# Patient Record
Sex: FEMALE | Race: WHITE | Employment: OTHER | URBAN - NONMETROPOLITAN AREA
[De-identification: names, ages, dates, MRNs, and addresses within clinical notes are randomized per-mention and may not be internally consistent; named-entity substitution may affect disease eponyms.]

---

## 2018-07-03 ENCOUNTER — HOSPITAL ENCOUNTER (OUTPATIENT)
Dept: GENERAL RADIOLOGY | Age: 73
Discharge: HOME | End: 2018-07-05
Payer: MEDICARE

## 2018-07-03 DIAGNOSIS — R13.14 PHARYNGOESOPHAGEAL DYSPHAGIA: ICD-10-CM

## 2018-07-03 PROCEDURE — G8997 SWALLOW GOAL STATUS: HCPCS

## 2018-07-03 PROCEDURE — G8998 SWALLOW D/C STATUS: HCPCS

## 2018-07-03 PROCEDURE — A4641 RADIOPHARM DX AGENT NOC: HCPCS | Performed by: OTOLARYNGOLOGY

## 2018-07-03 PROCEDURE — 74230 X-RAY XM SWLNG FUNCJ C+: CPT

## 2018-07-03 PROCEDURE — G8996 SWALLOW CURRENT STATUS: HCPCS

## 2018-07-03 PROCEDURE — 6360000004 HC RX CONTRAST MEDICATION: Performed by: OTOLARYNGOLOGY

## 2018-07-03 RX ADMIN — BARIUM SULFATE 355 ML: 0.6 SUSPENSION ORAL at 14:21

## 2018-07-03 NOTE — PROCEDURES
INSTRUMENTAL SWALLOW REPORT  MODIFIED BARIUM SWALLOW    NAME: Adair Saenz   : 1945  MRN: [id number]       Date of Eval: 7/3/2018     Referring Physician: Rios Rodas  Referring Diagnosis(es): Referring Diagnosis: R13.12    Past Medical History:  has a past medical history of Eye disease and Thyroid disease. Past Surgical History:  has a past surgical history that includes Pilonidal cyst excision and Foot surgery (Bilateral). Current Diet Solid Consistency: Regular  Current Diet Liquid Consistency: Thin    Type of Study: Repeat MBS  Results of Prior Study: Pt stated she had a previous MBS completed in [place]. Per pt, findings showed \"food getting stuck\" but was unaware of any diet changes made at this time. Patient Complaints/Reason for Referral:  Adair Saenz was referred for a MBS to assess the efficiency of his/her swallow function, assess for aspiration, and to make recommendations regarding safe dietary consistencies, effective compensatory strategies, and safe eating environment. Patient complaints: \"food gets stuck in my throat\"    Onset of problem: Pt states she has had difficulty swallow for approximately 1-2 years. Behavior/Cognition/Vision/Hearing:  Behavior/Cognition: Alert; Cooperative  Vision: Impaired  Vision Exceptions: Wears glasses at all times  Hearing: Within functional limits    Impressions:  Treatment Dx and ICD 10: R13.12   Patient Position: Lateral     Consistencies Administered: Thin teaspoon; Thin cup;Nectar cup;Puree    Compensatory Swallowing Strategies Attempted: Alternate solids and liquids;Effortful swallow;Small bites/sips;Upright as possible for all oral intake;Swallow 2 times per bite/sip; Chin tuck  Postural Changes and/or Swallow Maneuvers Trialed: Chin tuck    Oral Preparation / Oral Phase  Oral Phase: WFL  Oral Phase: lingual stasis but overall functional and age appropriate oral phase.   Unable to assess mech soft or regular solid due to concern of aspiration    Pharyngeal Phase  Pharyngeal Phase: Impaired  Pharyngeal Phase - Major Contributing Deficits  Reduced Tongue Base: Thin teaspoon; Thin cup;Nectar cup;Puree  Shallow Penetration After: Nectar cup;Puree  Deep Penetration During: Thin teaspoon; Thin cup (noted improvement with chin tuck)  Deep Penetration After: Thin teaspoon; Thin cup;Puree (lessened with chin tuck but still present)  Pharyngeal Residue - Valleculae: Thin teaspoon; Thin cup;Nectar cup;Puree  Pharyngeal Residue - UES: Nectar cup;Puree  Pharyngeal Phase: Reduced tongue base retraction resulting in vallecular stasis for all consistencies trialed. Noted deep penetration during swallow for all trials of thin and deep penetration after swallow due to vallecular stasis on thin, thick, and puree. Deep penetration of thins would likely have resulted in aspiration due to no sensation of presence,; however, was expelled when pt was directed to cough. Improvements noted with chin tuck resulting in shallow penetration after the swallow for thickened liquids and puree. Upper Esophageal Sphincter residue was noted to increase with a thicker consistency of the bolus. Esophageal Phase  Esophageal Screen: WFL    Dysphagia Outcome Severity Scale: Level 2: Moderate Severe dysphagia- Maximum assistance or maximum use of strategies with partial PO only  Penetration-Aspiration Scale (PAS): 5 - Material enters the airway, contacts the vocal folds, and is not ejected into the airway    Recommended Diet:  Solid consistency: Dysphagia I Pureed  Liquid consistency: Nectar  Liquid administration via: Cup    Medication administration: Meds in puree    Safe Swallow Protocol:  Supervision: Independent  Compensatory Swallowing Strategies: Chin tuck; Alternate solids and liquids;Effortful swallow;Small bites/sips;Upright as possible for all oral intake;Swallow 2 times per bite/sip    Recommendations/Treatment  Requires SLP Intervention: diet adjustments made her reflux is currently well managed. MBS revealed a moderate-severe oral-pharyngeal dysphagia characterized by lingual stasis and reduced tongue base retraction leading to vallecular stasis and resulting in deep penetration during and after the swallow. Reduced tongue base retraction resulted in vallecular stasis for all consistencies trialed (thin via spoon and cup, thick via cup, and puree). Deep penetration noted during swallow for all trials of thin and deep penetration after swallow due to vallecular stasis on thin, thick, and puree. Deep penetration of thins would likely have resulted in aspiration as the pt had no sensation as no cough or throat clear was elicited; however, it was expelled when pt was directed to elicit a cough. Improvements were noted with the use of a chin tuck resulting in shallow penetration after the swallow for thickened liquids and puree. Pt was also directed to utilize a second effortful swallow while still in chin tuck position to assist with clearance of vallecular residue to eliminate penetration after swallow. Upper Esophageal Sphincter residue was noted to increase with a thicker consistency of the bolus. Mech soft and regular solid were not trialed based on results from previous trials of other consistencies. Recommend pt for therapy for instruction and training of strengthening exercises as well as a repeat MBS after several weeks of treatment to reassess for changes. Recommend diet downgrade to puree and thickened liquids with the use of a chin tuck at all times due to safety concerns.   The following compensatory strategies are also recommended at this time: Alternate solids and liquids;Effortful swallow;Small bites/sips;Upright as possible for all oral intake;Swallow 2 times per bite/sip    Marian Smith M.A. CF-SLP  7/3/2018, 3:02 PM

## 2018-08-02 ENCOUNTER — HOSPITAL ENCOUNTER (OUTPATIENT)
Dept: SPEECH THERAPY | Age: 73
Setting detail: THERAPIES SERIES
Discharge: HOME | End: 2018-08-02
Payer: MEDICARE

## 2018-08-02 PROCEDURE — G8997 SWALLOW GOAL STATUS: HCPCS

## 2018-08-02 PROCEDURE — 92610 EVALUATE SWALLOWING FUNCTION: CPT

## 2018-08-02 PROCEDURE — G8996 SWALLOW CURRENT STATUS: HCPCS

## 2018-08-03 PROCEDURE — G8996 SWALLOW CURRENT STATUS: HCPCS

## 2018-08-03 PROCEDURE — G8997 SWALLOW GOAL STATUS: HCPCS

## 2018-08-03 NOTE — PROGRESS NOTES
Speech Language Pathology  Facility/Department: [address] [place] [address]   BEDSIDE SWALLOW EVALUATION    NAME: Vidya Oneal  : 1945  MRN: [id number]    ADMISSION DATE: 2018      Onset Date:  (~1 year ago)    Past Medical History:  has a past medical history of Eye disease and Thyroid disease. Past Surgical History:  has a past surgical history that includes Pilonidal cyst excision and Foot surgery (Bilateral). Treatment Dx (ICD 10 code): Pharyngoesophageal Dysphagia T47.10  Insurance/Certification Information: Medicare/Humana  Plan of Care Submitted: (y/n) yes      Date of Eval: 8/3/2018  Evaluating Therapist: Van Nagel    Current Diet level:  Current Diet : Regular  Current Liquid Diet : Thin    Primary Complaint:   Patient Complaint: Patient's main c/o of swallowing trouble r/t food \"getting stuck\" in throat area. Patient reports at times it is so bad she cannot breath. Patient reports  has given back blows to loosen food. Patient reports they don't eat out as much d/t being fearful of trouble swallowing and notes that this has become a daily problem/annoyance. Pain:   Pain Assessment  Patient Currently in Pain: Denies & denies pain with swallowing     Reason for Referral  Vidya Oneal was referred for a bedside swallow evaluation to assess the efficiency of her swallow function, identify signs and symptoms of aspiration, and make recommendations regarding safe dietary consistencies, effective compensatory strategies, and safe eating environment. Impression  Dysphagia Diagnosis  Dysphagia Diagnosis: Mild oral stage dysphagia; Moderate to severe pharyngeal stage dysphagia  Dysphagia Outcome Severity Scale: Level 3: Moderate dysphagia- Total assisstance, supervision or strategies.  Two or more diet consistencies restricted    Treatment Plan  Requires SLP Intervention: Yes  Referral To: GI  Duration/Frequency of Treatment: 1x/week   D/C Recommendations: Home independently      Recommended Diet and Intervention  Solid: Diet Solids Recommendation: Regular  Liquid: Liquid Consistency Recommendation: Thin  Medication:Recommended Form of Meds: Meds in puree  Therapeutic Interventions: Diet tolerance monitoring, Effortful swallow, Laryngeal exercises, She, Shaker, Pharyngeal exercises, Patient/Family education, Tongue base strengthening, Oral motor exercises, Mendelsohn    Compensatory Swallowing Strategies Attempted  Compensatory Swallowing Strategies: Alternate solids and liquids;Eat/Feed slowly;Upright as possible for all oral intake;Effortful swallow;Remain upright for 30-45 minutes after meals;Small bites/sips;Swallow 2 times per bite/sip; Chin tuck      Treatment/Goals  Short-term Goals  Timeframe for Short-term Goals: 30 days  Goal 1: Patient will complete OME and PE x50 independently   Goal 2: Patient will demonstrate good understanding of HEP   Goal 3: Patient will tolerate trials during therapy (with use of rec safe swallowing strategies) in 75% of trials without overt s/s pen/asp  Long-term Goals  Timeframe for Long-term Goals: 90 days   Goal 1: Patient will safely tolerate LRD in 80% of observed opportunities without overt s/s pen/asp  Dysphagia Goals: The patient will tolerate recommended diet without observed clinical signs of aspiration; The patient will tolerate instrumental swallowing procedure; The patient will recall and perform compensatory strategies, with no cues. General  Chart Reviewed: Yes  Visit Information  Onset Date:  (~1 year ago)  SLP Insurance Information: Medicare/Humana  Total # of Visits to Date: 1  Comments: Reviewed MBS Patient had at [facility] on 7/2/2018 - recommendations from [facility] included: use of chin tuck, outpatient therapy for strengthening, diet of puree and nectar with meds in puree. Subjective: Pt presents to [address] [place] [address] with concerns for trouble swallowing.   Patient reported that food is often \"getting stuck\"

## 2018-08-09 ENCOUNTER — HOSPITAL ENCOUNTER (OUTPATIENT)
Dept: SPEECH THERAPY | Age: 73
Setting detail: THERAPIES SERIES
Discharge: HOME | End: 2018-08-09
Payer: MEDICARE

## 2018-08-09 PROCEDURE — 92526 ORAL FUNCTION THERAPY: CPT

## 2018-08-09 NOTE — PROGRESS NOTES
Phone: [address] [facility]    Fax: [phone]                                 Outpatient Speech Therapy                               DAILY TREATMENT NOTE    Date: 8/9/2018  Patients Name:  Giancarlo Orellana  YOB: 1945 (68 y.o.)  Gender:  female  MRN:  [id number]  [facility] #: [id number]  Referring Dori Chong. INSURANCE  SLP Insurance Information: Medicare/Humana       Total # of Visits to Date: 2       PAIN  [x]No     []Yes      Pain Rating (0-10 pain scale): 0  Location:  N/A  Pain Description:  NA    SUBJECTIVE  Patient presents to clinic independently     SHORT TERM GOALS/ TREATMENT SESSION:  Subjective report:           Patient had no questions from previous session. Patient noted that no foods stuck out as being significantly easier or more difficult to tolerate. Goal 1: Patient will complete OME and PE x50 independently     Patient completed lingual exercises for tongue base retraction and PE with min cues and occasional demonstration     []Met  [x]Partially met  []Not met   Goal 2: Patient will demonstrate good understanding of HEP       Patient did not journal foods over course of last week that caused trouble. Ongoing      []Met  [x]Partially met  []Not met   Goal 3: Patient will tolerate trails during therapy (with use of rec safe swallowing strategies) in 75% of trials without overt s/s pen/asp       Patient tolerated thin water trails without overt s/s pen/asp in ~50% of trails.   Patient attempted trails with postural maneuvers (head turn to each side) and noted that head turn to the right seemed to help - Pt had difficulty explaining the nature of how the swallow felt improved but did state: \"it went down all together\" and \"it felt like it opened up more\"     []Met  [x]Partially met  []Not met     LONG TERM GOALS/ TREATMENT SESSION:  Goal 1: Patient will safely tolerate LRD in 80% of observed opportunities without overt s/s pen/asp Ongoing  []Met  [x]Partially met  []Not met       EDUCATION/HOME EXERCISE PROGRAM (HEP)  New Education/HEP provided to patient/family/caregiver:    [x]Yes:     []No (Continued review of prior education)   If yes Education Provided:     Method of Education:     [x]Discussion     [x]Demonstration    [] Written     []Other  Evaluation of Patients Response to Education:         []Patient and or caregiver verbalized understanding  []Patient and or Caregiver Demonstrated without assistance   []Patient and or Caregiver Demonstrated with assistance  [x]Needs additional instruction to demonstrate understanding of education    ASSESSMENT  Patient tolerated todays treatment session:    [x] Good   []  Fair   []  Poor  Limitations/difficulties with treatment session due to:   []Pain     []Fatigue     []Other medical complications     []Other    Comments:    PLAN  [x]Continue with current plan of care  [][facility]  []IHold per patient request  [] Change Treatment plan:  [] Insurance hold  __ Other     TIME   Time Treatment session was INITIATED 1500   Time Treatment session was STOPPED 1530    30     Charges: 1  Electronically signed by:    Ellie Benitez M.A.CCC-SLP              Date:8/9/2018

## 2018-08-16 ENCOUNTER — HOSPITAL ENCOUNTER (OUTPATIENT)
Dept: SPEECH THERAPY | Age: 73
Setting detail: THERAPIES SERIES
Discharge: HOME | End: 2018-08-16
Payer: MEDICARE

## 2018-08-16 PROCEDURE — 92526 ORAL FUNCTION THERAPY: CPT

## 2018-08-22 ENCOUNTER — HOSPITAL ENCOUNTER (OUTPATIENT)
Dept: SPEECH THERAPY | Age: 73
Setting detail: THERAPIES SERIES
Discharge: HOME | End: 2018-08-22
Payer: MEDICARE

## 2018-08-22 PROCEDURE — 92507 TX SP LANG VOICE COMM INDIV: CPT

## 2018-08-22 NOTE — PROGRESS NOTES
swallowing strategies) in 75% of trials without overt s/s pen/asp       Patient tolerated thin water trials without overt s/s pen/asp in 75% of trials. Patient presented with wet vocal quality after trials following solid consistencies. No s/sx pen/asp prior to solid trials. Patient was unable to consume mechanical soft solids without \"feeling like it stuck\" despite chin tuck and head turn to left and right. 30-40 seconds required before patient felt the bolus had passed. Patient reported that chin tuck \"made it worse. \"    Patient trialed 1 bite of regular solids. Patient presented with delayed cough and reports that she does not typically eat regular solid foods. []Met  []Partially met  []Not met     LONG TERM GOALS/ TREATMENT SESSION:  Goal 1: Patient will safely tolerate LRD in 80% of observed opportunities without overt s/s pen/asp In progress []Met  [x]Partially met  []Not met       EDUCATION/HOME EXERCISE PROGRAM (HEP)  New Education/HEP provided to patient/family/caregiver:    [x]Yes:     []No (Continued review of prior education)   If yes Education Provided:  Patient provided education on results of MBS and rationale for swallowing exercises. ST also provided visual of anatomy related to swallow.      Method of Education:     [x]Discussion     [x]Demonstration    [] Written     []Other  Evaluation of Patients Response to Education:         [x]Patient and or caregiver verbalized understanding  []Patient and or Caregiver Demonstrated without assistance   []Patient and or Caregiver Demonstrated with assistance  []Needs additional instruction to demonstrate understanding of education    ASSESSMENT  Patient tolerated todays treatment session:    [x] Good   []  Fair   []  Poor  Limitations/difficulties with treatment session due to:   []Pain     []Fatigue     []Other medical complications     []Other    Comments:    PLAN  [x]Continue with current plan of care  [][facility]  []IHold per patient request  [] Change Treatment plan:  [] Insurance hold  __ Other     TIME   Time Treatment session was INITIATED 1530   Time Treatment session was STOPPED 1600    30     Charges: 1  Electronically signed by:    Dionna YIN-[facility]              Date:8/22/2018

## 2018-08-30 ENCOUNTER — HOSPITAL ENCOUNTER (OUTPATIENT)
Dept: SPEECH THERAPY | Age: 73
Setting detail: THERAPIES SERIES
Discharge: HOME | End: 2018-08-30
Payer: MEDICARE

## 2018-08-30 PROCEDURE — 92526 ORAL FUNCTION THERAPY: CPT

## 2018-08-30 NOTE — PROGRESS NOTES
Phone: [phone]    Fax: [phone]                                 Outpatient Speech Therapy                               DAILY TREATMENT NOTE    Date: 8/30/2018  Patients Name:  Arya Minor  YOB: 1945 (68 y.o.)  Gender:  female  MRN:  [id number]  [facility] #: [id number]  Referring Emani Camacho. INSURANCE  SLP Insurance Information: Medicare/Humana       Total # of Visits to Date: 5           Current Authorization        PAIN  [x]No     []Yes      Pain Rating (0-10 pain scale): 0  Location:  N/A  Pain Description:  NA    SUBJECTIVE  Patient presents to clinic with herself on time. SHORT TERM GOALS/ TREATMENT SESSION:  Subjective report:           Patient was pleasant and cooperative throughout therapy session. Patient reports that in the past week, it feels like her swallowing is inconsistently better. She has talked to her  about esophageal dialation; however, inquired about which doctor to discuss this with. Goal 1: Patient will complete OME and PE x50 independently     Patient completed  OME and PE x50 independently     [x]Met  []Partially met  []Not met   Goal 2: Patient will demonstrate good understanding of HEP       Patient with good understanding of HEP; however, patient reports that she hopes to improve the frequency she performs exercises at home. [x]Met  []Partially met  []Not met   Goal 3: Patient will tolerate trails during therapy (with use of rec safe swallowing strategies) in 75% of trials without overt s/s pen/asp       Patient tolerate trails of mechanical soft solids and thin liquids with no overt s/sx of dysphagia in 100% of opportunities trialed; however she reported that the food felt stuck in 100% of opportunities. ST trailed, head turn to left and right, chin tuck, head tilt, and Mendelson maneuver during swallowing. Pt reports no improvement with any strategies.  [x]Met  []Partially met  []Not met     LONG TERM GOALS/ TREATMENT SESSION:  Goal 1: Patient will safely tolerate LRD in 80% of observed opportunities without overt s/s pen/asp In progress [x]Met  [x]Partially met  []Not met       EDUCATION/HOME EXERCISE PROGRAM (HEP)  New Education/HEP provided to patient/family/caregiver:    [x]Yes:     []No (Continued review of prior education)   If yes Education Provided: Overviewed additional exercises to complete at home.      Method of Education:     [x]Discussion     []Demonstration    [] Written     []Other  Evaluation of Patients Response to Education:         []Patient and or caregiver verbalized understanding  []Patient and or Caregiver Demonstrated without assistance   []Patient and or Caregiver Demonstrated with assistance  []Needs additional instruction to demonstrate understanding of education    ASSESSMENT  Patient tolerated todays treatment session:    [x] Good   []  Fair   []  Poor  Limitations/difficulties with treatment session due to:   []Pain     []Fatigue     []Other medical complications     []Other    Comments:    PLAN  [x]Continue with current plan of care  [][facility]  []IHold per patient request  [] Change Treatment plan:  [] Insurance hold  __ Other     TIME   Time Treatment session was INITIATED 1605   Time Treatment session was STOPPED 9990 4070    30     Charges: 1  Electronically signed by:    Tracey De Los Santos M.S. CF-SLP              Date:8/30/2018

## 2018-09-06 ENCOUNTER — HOSPITAL ENCOUNTER (OUTPATIENT)
Dept: SPEECH THERAPY | Age: 73
Setting detail: THERAPIES SERIES
Discharge: HOME | End: 2018-09-06
Payer: MEDICARE

## 2018-09-06 PROCEDURE — 92507 TX SP LANG VOICE COMM INDIV: CPT

## 2018-09-06 NOTE — PROGRESS NOTES
Phone: [address] [facility]    Fax: [phone]                                 Outpatient Speech Therapy                               DAILY TREATMENT NOTE    Date: 9/6/2018  Patients Name:  Arya Minor  YOB: 1945 (68 y.o.)  Gender:  female  MRN:  [id number]  [facility] #: [id number]  Referring Emani Camacho. INSURANCE  SLP Insurance Information: Medicare/Humana       Total # of Visits to Date: 6           Current Authorization        PAIN  [x]No     []Yes      Pain Rating (0-10 pain scale): 0  Location:  N/A  Pain Description:  NA    SUBJECTIVE  Patient presents to clinic with self. SHORT TERM GOALS/ TREATMENT SESSION:  Subjective report:           Patient reports improvement with swallowing function; however, she is not able to name how it is better. Discussed contacting ENT for referral for Esophageal Dilation. Goal 1: Patient will complete OME and PE x50 independently     Mendelsohn X15  Modified Shaker with ball: x 5 holding for 30 seconds. Supraglottic Swallow: x 20  She:     Wet vocal quality x 3 during sips of thin liquids;     Cough x 1 following Thin liquids. [x]Met  []Partially met  []Not met   Goal 2: Patient will demonstrate good understanding of HEP       Patient reports good carryover of HEP and has been practicing exercises at home. [x]Met  []Partially met  []Not met   Goal 3: Patient will tolerate trails during therapy (with use of rec safe swallowing strategies) in 75% of trials without overt s/s pen/asp       Mech soft- 0  Pt reports head tilt to the right was beneficial during previous meal.     With head tilt to right and 2nd swallow- 1111    Head tilt to the left-0- patient reports increased difficulty with this maneuver.     Thin liquids head tilt to right- 11 []Met  [x]Partially met  []Not met               LONG TERM GOALS/ TREATMENT SESSION:  Goal 1: Patient will safely tolerate LRD in 80% of

## 2018-09-14 ENCOUNTER — HOSPITAL ENCOUNTER (OUTPATIENT)
Dept: SPEECH THERAPY | Age: 73
Setting detail: THERAPIES SERIES
Discharge: HOME | End: 2018-09-14
Payer: MEDICARE

## 2018-09-14 PROCEDURE — 92507 TX SP LANG VOICE COMM INDIV: CPT

## 2018-09-14 NOTE — PROGRESS NOTES
Phone: [phone] [facility]    Fax: [phone]                                 Outpatient Speech Therapy                               DAILY TREATMENT NOTE    Date: 9/14/2018  Patients Name:  Marcie Middleton  YOB: 1945 (68 y.o.)  Gender:  female  MRN:  [id number]  [facility] #: [id number]  Referring Kindra Monaco. INSURANCE  SLP Insurance Information: Medicare/Humana       Total # of Visits to Date: 7           Current Authorization        PAIN  [x]No     []Yes      Pain Rating (0-10 pain scale): 0  Location:  N/A  Pain Description:  NA    SUBJECTIVE  Patient presents to clinic with herself. SHORT TERM GOALS/ TREATMENT SESSION:  Subjective report:           Patient reports doing well with head tilt to the right side and reports that she has not had anything feel \"stuck\" when trying this maneuver; however, when she didn't use it, it felt like \"almost choked me\" when eating a [place] lozano. Goal 1: Patient will complete OME and PE x50 independently     Mendelsohn x 20  Modified Shaker x 2  Supraglottic Swallow x15  She x 15 [x]Met  []Partially met  []Not met   Goal 2: Patient will demonstrate good understanding of HEP       Good understanding of HEP. Patient completing exercises more frequently at home.     [x]Met  []Partially met  []Not met   Goal 3: Patient will tolerate trails during therapy (with use of rec safe swallowing strategies) in 75% of trials without overt s/s pen/asp       Mechanical soft with second swallow: 11    Regular: 100    Thin : Throat clear 2/13  Wet vocal quality :1 []Met  []Partially met  []Not met     LONG TERM GOALS/ TREATMENT SESSION:  Goal 1: Patient will safely tolerate LRD in 80% of observed opportunities without overt s/s pen/asp In progress  []Met  [x]Partially met  []Not met       EDUCATION/HOME EXERCISE PROGRAM (HEP)  New Education/HEP provided to patient/family/caregiver:    [x]Yes:     []No (Continued review of prior education)   If yes Education Provided: Provided additional exercises to complete at home and encouraged patient to consult ENT for management of UES opening with possible UES dilation.   Method of Education:     [x]Discussion     [x]Demonstration    [] Written     []Other  Evaluation of Patients Response to Education:         [x]Patient and or caregiver verbalized understanding  []Patient and or Caregiver Demonstrated without assistance   []Patient and or Caregiver Demonstrated with assistance  []Needs additional instruction to demonstrate understanding of education    ASSESSMENT  Patient tolerated todays treatment session:    [x] Good   []  Fair   []  Poor  Limitations/difficulties with treatment session due to:   []Pain     []Fatigue     []Other medical complications     []Other    Comments:    PLAN  [x]Continue with current plan of care  [][facility]  []IHold per patient request  [] Change Treatment plan:  [] Insurance hold  __ Other     TIME   Time Treatment session was INITIATED 1400   Time Treatment session was STOPPED 1430    30     Charges: 1  Electronically signed by:    Elida YIN-SLP              Date:9/14/2018

## 2018-09-20 ENCOUNTER — HOSPITAL ENCOUNTER (OUTPATIENT)
Dept: SPEECH THERAPY | Age: 73
Setting detail: THERAPIES SERIES
Discharge: HOME | End: 2018-09-20
Payer: MEDICARE

## 2018-09-20 PROCEDURE — 92507 TX SP LANG VOICE COMM INDIV: CPT

## 2018-09-20 NOTE — PROGRESS NOTES
will safely tolerate LRD in 80% of observed opportunities without overt s/s pen/asp In progress []Met  []Partially met  []Not met       EDUCATION/HOME EXERCISE PROGRAM (HEP)  New Education/HEP provided to patient/family/caregiver:    []Yes:     [x]No (Continued review of prior education)   If yes Education Provided:    Method of Education:     [x]Discussion     []Demonstration    [] Written     []Other  Evaluation of Patients Response to Education:         []Patient and or caregiver verbalized understanding  []Patient and or Caregiver Demonstrated without assistance   []Patient and or Caregiver Demonstrated with assistance  []Needs additional instruction to demonstrate understanding of education    ASSESSMENT  Patient tolerated todays treatment session:    [x] Good   []  Fair   []  Poor  Limitations/difficulties with treatment session due to:   []Pain     []Fatigue     []Other medical complications     []Other    Comments:    PLAN  [x]Continue with current plan of care  [][facility]  []IHold per patient request  [] Change Treatment plan:  [] Insurance hold  __ Other     TIME   Time Treatment session was INITIATED 1530   Time Treatment session was STOPPED 1600    30     Charges: 1  Electronically signed by:    Dionna YIN-SLP              Date:9/20/2018

## 2018-09-27 ENCOUNTER — HOSPITAL ENCOUNTER (OUTPATIENT)
Dept: SPEECH THERAPY | Age: 73
Setting detail: THERAPIES SERIES
Discharge: HOME | End: 2018-09-27
Payer: MEDICARE

## 2018-09-27 PROCEDURE — 92507 TX SP LANG VOICE COMM INDIV: CPT

## 2018-09-27 NOTE — PROGRESS NOTES
Phone: [address]    Fax: [phone]                                 Outpatient Speech Therapy                               DAILY TREATMENT NOTE    Date: 9/27/2018  Patients Name:  Cecilia Flynn  YOB: 1945 (68 y.o.)  Gender:  female  MRN:  [id number]  [facility] #: [id number]  Referring Marina Walker. INSURANCE  SLP Insurance Information: Medicare/Humana       Total # of Visits to Date: 9           Current Authorization        PAIN  [x]No     []Yes      Pain Rating (0-10 pain scale):   Location:  N/A  Pain Description:  NA    SUBJECTIVE  Patient presents to clinic with self. SHORT TERM GOALS/ TREATMENT SESSION:  Subjective report:           Patient arrived to session 10 minutes early and was taken back 10 minutes early. Patient reports that she is eager for visit with ENT on Monday, October 1st. Patient reports increased difficulty with swallowing as she has had more drainage lately. Goal 1: Patient will complete OME and PE x50 independently     Mendelson: x 20  She: x 20  Supraglottic: x20  Pitch glides x 15 each  Effortful: x10    [x]Met  []Partially met  []Not met   Goal 2: Patient will demonstrate good understanding of HEP       Great understanding of HEP. Reported to complete exercises at home. []Met  [x]Partially met  []Not met   Goal 3: Patient will tolerate trails during therapy (with use of rec safe swallowing strategies) in 75% of trials without overt s/s pen/asp       Trials of bread: 001- Throat clears; feeling stuck   Trials of fruit: [id number]- clearing throat more today.       With head turn and second swallow       []Met  [x]Partially met  []Not met     LONG TERM GOALS/ TREATMENT SESSION:  Goal 1: Patient will safely tolerate LRD in 80% of observed opportunities without overt s/s pen/asp In progress []Met  [x]Partially met  []Not met       EDUCATION/HOME EXERCISE PROGRAM (HEP)  New Education/HEP provided to patient/family/caregiver:    []Yes:     [x]No (Continued review of prior education)   If yes Education Provided:   Method of Education:     [x]Discussion     []Demonstration    [] Written     []Other  Evaluation of Patients Response to Education:         [x]Patient and or caregiver verbalized understanding  []Patient and or Caregiver Demonstrated without assistance   []Patient and or Caregiver Demonstrated with assistance  []Needs additional instruction to demonstrate understanding of education    ASSESSMENT  Patient tolerated todays treatment session:   [x]  Good   []  Fair   []  Poor  Limitations/difficulties with treatment session due to:   [x]Pain     []Fatigue     []Other medical complications     []Other    Comments:    PLAN  [x]Continue with current plan of care  [][facility]  []IHold per patient request  [] Change Treatment plan:  [] Insurance hold  __ Other     TIME   Time Treatment session was INITIATED 1520   Time Treatment session was STOPPED 1550    30     Charges: 1  Electronically signed by:    Dionna Mosley M.S. CF-SLP              Date:9/27/2018

## 2018-10-04 ENCOUNTER — HOSPITAL ENCOUNTER (OUTPATIENT)
Dept: SPEECH THERAPY | Age: 73
Setting detail: THERAPIES SERIES
Discharge: HOME | End: 2018-10-04
Payer: MEDICARE

## 2018-10-04 PROCEDURE — 92507 TX SP LANG VOICE COMM INDIV: CPT

## 2018-10-04 NOTE — PROGRESS NOTES
EXERCISE PROGRAM (HEP)  New Education/HEP provided to patient/family/caregiver:    []Yes:     [x]No (Continued review of prior education)   If yes Education Provided:     Method of Education:     [x]Discussion     []Demonstration    [] Written     []Other  Evaluation of Patients Response to Education:         [x]Patient and or caregiver verbalized understanding  []Patient and or Caregiver Demonstrated without assistance   []Patient and or Caregiver Demonstrated with assistance  []Needs additional instruction to demonstrate understanding of education    ASSESSMENT  Patient tolerated todays treatment session:    [x] Good   []  Fair   []  Poor  Limitations/difficulties with treatment session due to:   []Pain     []Fatigue     []Other medical complications     []Other    Comments:    PLAN  [x]Continue with current plan of care  [facility]  []IHold per patient request  [] Change Treatment plan:  [] Insurance hold  __ Other     TIME   Time Treatment session was INITIATED 1530   Time Treatment session was STOPPED 1600    30     Charges: 1  Electronically signed by:    Ines Nascimento M.S. CF-SLP              Date:10/4/2018

## 2018-10-11 ENCOUNTER — HOSPITAL ENCOUNTER (OUTPATIENT)
Dept: SPEECH THERAPY | Age: 73
Setting detail: THERAPIES SERIES
Discharge: HOME | End: 2018-10-11
Payer: MEDICARE

## 2018-10-11 PROCEDURE — 92507 TX SP LANG VOICE COMM INDIV: CPT

## 2018-10-11 NOTE — PROGRESS NOTES
Phone: [address]    Fax: [phone]                                 Outpatient Speech Therapy                               DAILY TREATMENT NOTE    Date: 10/11/2018  Patients Name:  Gurpreet Bazan  YOB: 1945 (68 y.o.)  Gender:  female  MRN:  [id number]  [facility] #: [id number]  Referring Cristina Dave. INSURANCE  SLP Insurance Information: Medicare/Humana       Total # of Visits to Date: 6           Current Authorization        PAIN  []No     []Yes      Pain Rating (0-10 pain scale): 0  Location:  N/A  Pain Description:  NA    SUBJECTIVE  Patient presents to clinic with self. SHORT TERM GOALS/ TREATMENT SESSION:  Subjective report:          Patient reports having a bad experience while eating toast the past week. She states she was eating toast and did not have water with her. She attempted to wash it down with multiple large sips of water; however, \"I drank too much and cut off my air. \" She reported that she had to take 12-13 large gasps for air. Patient to have esophageal dilation next Tuesday. Goal 1: Patient will complete OME and PE x50 independently     Mendelsonx 20  She x 20  Jaw Opening: x10  Pitch Glides: 15  Supraglottic: x20  Sustained pitch at multiple levels: x 15 []Met  [x]Partially met  []Not met   Goal 2: Patient will demonstrate good understanding of HEP       Great understanding and implementation of HEP.     []Met  [x]Partially met  []Not met   Goal 3: Patient will tolerate trails during therapy (with use of rec safe swallowing strategies) in 75% of trials without overt s/s pen/asp       Thin liquids: 0(TC)128237(TC) 0(Cough)11 (TC)      Mechanical Soft with  [id number]      Dental soft solids: 0 (cough) 0 (cough)    Patient felt dental soft was getting \"stuck\" much more often this date, required frequent  []Met  [x]Partially met  []Not met     LONG TERM GOALS/ TREATMENT SESSION:  Goal 1: Patient will safely

## 2018-10-18 ENCOUNTER — HOSPITAL ENCOUNTER (OUTPATIENT)
Dept: SPEECH THERAPY | Age: 73
Setting detail: THERAPIES SERIES
Discharge: HOME | End: 2018-10-18
Payer: MEDICARE

## 2018-10-18 PROCEDURE — 92507 TX SP LANG VOICE COMM INDIV: CPT

## 2018-10-18 NOTE — DISCHARGE SUMMARY
Phone: [facility]    Fax: [phone]                       Outpatient Speech Therapy                                                                         [place]    Date: 10/18/2018    Patient Name: Makayla Deluna         : [id number]  (78 y.o.)    Gender: female [facility] #: [id number]    Diagnosis: Diagnosis: Pharyngoesophageal Dysphagia R13.14  Luly Bustos MD   Referring physician: Dell Azar. Onset Date: ~ 1 Year ago      Compliance with Therapy  [x] Good [] Fair  [] Poor  INSURANCE  Total # of Visits to Date: 15,               Short-term Goal(s):   Progress at discharge   Goal 1: Patient will complete OME and PE x50 independently     [x]Met  []Partially met  []Not met   Goal 2: Patient will demonstrate good understanding of HEP     [x]Met  []Partially met  []Not met   Goal 3: Patient will tolerate trails during therapy (with use of rec safe swallowing strategies) in 75% of trials without overt s/s pen/asp [x]Met  []Partially met  []Not met       Discharge Status  [x] Patient received maximum benefit. No further therapy indicated at this time. [] Patient demonstrated improvement from conditions with    /    goals met  [] Patient to continue exercises/home instructions independently. [] Therapy interrupted due to:  [] Patient has completed their prescribed number of treatment sessions.   [] Other:    Progress during therapy:  [x]  Patient demonstrated improved level of function  [] Patient declined in level of function secondary to:  [] No Change    Additional Comments:    G-Codes  [x] Swallowing        [] Motor Speech       [] Spoken Language Comprehension                   [] Spoken Language Expression     [] Attention        [] Memory    [] Voice  [] Other SLP Functional Limitations    Current:  [] CH: 0 percent limited   [x] CI: 1% but < 20% impaired  [] CJ: 20% but < 40% impaired  [] CK: 40% but < 60% impaired  [] CL: 60% but < 80% impaired  []

## 2019-01-10 ENCOUNTER — HOSPITAL ENCOUNTER (OUTPATIENT)
Dept: SPEECH THERAPY | Age: 74
Setting detail: THERAPIES SERIES
Discharge: HOME | End: 2019-01-10
Payer: MEDICARE

## 2020-05-27 ENCOUNTER — HOSPITAL ENCOUNTER (OUTPATIENT)
Dept: MRI IMAGING | Age: 75
Discharge: HOME | End: 2020-05-29
Payer: MEDICARE

## 2020-05-27 ENCOUNTER — HOSPITAL ENCOUNTER (OUTPATIENT)
Dept: LAB | Age: 75
Discharge: HOME | End: 2020-05-27
Payer: MEDICARE

## 2020-05-27 LAB
CREAT SERPL-MCNC: 0.64 MG/DL (ref 0.5–0.9)
GFR AFRICAN AMERICAN: >60 ML/MIN
GFR NON-AFRICAN AMERICAN: >60 ML/MIN
GFR SERPL CREATININE-BSD FRML MDRD: NORMAL ML/MIN/{1.73_M2}
GFR SERPL CREATININE-BSD FRML MDRD: NORMAL ML/MIN/{1.73_M2}

## 2020-05-27 PROCEDURE — 70553 MRI BRAIN STEM W/O & W/DYE: CPT

## 2020-05-27 PROCEDURE — A9579 GAD-BASE MR CONTRAST NOS,1ML: HCPCS | Performed by: OPHTHALMOLOGY

## 2020-05-27 PROCEDURE — 6360000004 HC RX CONTRAST MEDICATION: Performed by: OPHTHALMOLOGY

## 2020-05-27 PROCEDURE — 82565 ASSAY OF CREATININE: CPT

## 2020-05-27 PROCEDURE — 70543 MRI ORBT/FAC/NCK W/O &W/DYE: CPT

## 2020-05-27 PROCEDURE — 36415 COLL VENOUS BLD VENIPUNCTURE: CPT

## 2020-05-27 RX ADMIN — GADOTERIDOL 12 ML: 279.3 INJECTION, SOLUTION INTRAVENOUS at 09:25

## 2020-08-19 ENCOUNTER — HOSPITAL ENCOUNTER (OUTPATIENT)
Dept: LAB | Age: 75
Discharge: HOME | End: 2020-08-19
Payer: MEDICARE

## 2020-08-19 ENCOUNTER — HOSPITAL ENCOUNTER (OUTPATIENT)
Dept: CT IMAGING | Age: 75
Discharge: HOME | End: 2020-08-21
Payer: MEDICARE

## 2020-08-19 LAB
CREAT SERPL-MCNC: 0.71 MG/DL (ref 0.5–0.9)
GFR AFRICAN AMERICAN: >60 ML/MIN
GFR NON-AFRICAN AMERICAN: >60 ML/MIN
GFR SERPL CREATININE-BSD FRML MDRD: NORMAL ML/MIN/{1.73_M2}
GFR SERPL CREATININE-BSD FRML MDRD: NORMAL ML/MIN/{1.73_M2}

## 2020-08-19 PROCEDURE — 82565 ASSAY OF CREATININE: CPT

## 2020-08-19 PROCEDURE — 70482 CT ORBIT/EAR/FOSSA W/O&W/DYE: CPT

## 2020-08-19 PROCEDURE — 6360000004 HC RX CONTRAST MEDICATION: Performed by: PSYCHIATRY & NEUROLOGY

## 2020-08-19 PROCEDURE — 36415 COLL VENOUS BLD VENIPUNCTURE: CPT

## 2020-08-19 RX ADMIN — IOPAMIDOL 75 ML: 755 INJECTION, SOLUTION INTRAVENOUS at 16:19

## 2020-09-22 ENCOUNTER — HOSPITAL ENCOUNTER (OUTPATIENT)
Dept: LAB | Age: 75
Discharge: HOME | End: 2020-09-22
Payer: MEDICARE

## 2020-09-22 ENCOUNTER — HOSPITAL ENCOUNTER (OUTPATIENT)
Dept: GENERAL RADIOLOGY | Age: 75
Discharge: HOME | End: 2020-09-24
Payer: MEDICARE

## 2020-09-22 ENCOUNTER — HOSPITAL ENCOUNTER (OUTPATIENT)
Dept: MRI IMAGING | Age: 75
Discharge: HOME | End: 2020-09-24
Payer: MEDICARE

## 2020-09-22 LAB
CREAT SERPL-MCNC: 0.65 MG/DL (ref 0.5–0.9)
GFR AFRICAN AMERICAN: >60 ML/MIN
GFR NON-AFRICAN AMERICAN: >60 ML/MIN
GFR SERPL CREATININE-BSD FRML MDRD: NORMAL ML/MIN/{1.73_M2}
GFR SERPL CREATININE-BSD FRML MDRD: NORMAL ML/MIN/{1.73_M2}

## 2020-09-22 PROCEDURE — 72158 MRI LUMBAR SPINE W/O & W/DYE: CPT

## 2020-09-22 PROCEDURE — 36415 COLL VENOUS BLD VENIPUNCTURE: CPT

## 2020-09-22 PROCEDURE — 6360000004 HC RX CONTRAST MEDICATION: Performed by: NEUROLOGICAL SURGERY

## 2020-09-22 PROCEDURE — A9579 GAD-BASE MR CONTRAST NOS,1ML: HCPCS | Performed by: NEUROLOGICAL SURGERY

## 2020-09-22 PROCEDURE — 72110 X-RAY EXAM L-2 SPINE 4/>VWS: CPT

## 2020-09-22 PROCEDURE — 82565 ASSAY OF CREATININE: CPT

## 2020-09-22 RX ADMIN — GADOTERIDOL 13 ML: 279.3 INJECTION, SOLUTION INTRAVENOUS at 10:35

## 2021-01-06 ENCOUNTER — HOSPITAL ENCOUNTER (OUTPATIENT)
Age: 76
Setting detail: SPECIMEN
Discharge: HOME | End: 2021-01-06
Payer: MEDICARE

## 2021-01-06 LAB
ABSOLUTE EOS #: 0.43 K/UL (ref 0–0.44)
ABSOLUTE IMMATURE GRANULOCYTE: 0.04 K/UL (ref 0–0.3)
ABSOLUTE LYMPH #: 1.99 K/UL (ref 1.1–3.7)
ABSOLUTE MONO #: 0.98 K/UL (ref 0.1–1.2)
ALBUMIN SERPL-MCNC: 3.2 G/DL (ref 3.5–5.2)
ALBUMIN/GLOBULIN RATIO: 0.8 (ref 1–2.5)
ALP BLD-CCNC: 89 U/L (ref 35–104)
ALT SERPL-CCNC: 48 U/L (ref 5–33)
ANION GAP SERPL CALCULATED.3IONS-SCNC: 8 MMOL/L (ref 9–17)
AST SERPL-CCNC: 50 U/L
BASOPHILS # BLD: 1 % (ref 0–2)
BASOPHILS ABSOLUTE: 0.06 K/UL (ref 0–0.2)
BILIRUB SERPL-MCNC: 0.21 MG/DL (ref 0.3–1.2)
BILIRUBIN DIRECT: <0.08 MG/DL
BUN BLDV-MCNC: 20 MG/DL (ref 8–23)
BUN/CREAT BLD: 31 (ref 9–20)
CALCIUM SERPL-MCNC: 8.6 MG/DL (ref 8.6–10.4)
CHLORIDE BLD-SCNC: 96 MMOL/L (ref 98–107)
CO2: 31 MMOL/L (ref 20–31)
CREAT SERPL-MCNC: 0.65 MG/DL (ref 0.5–0.9)
DIFFERENTIAL TYPE: ABNORMAL
EOSINOPHILS RELATIVE PERCENT: 5 % (ref 1–4)
GFR AFRICAN AMERICAN: >60 ML/MIN
GFR NON-AFRICAN AMERICAN: >60 ML/MIN
GFR SERPL CREATININE-BSD FRML MDRD: ABNORMAL ML/MIN/{1.73_M2}
GFR SERPL CREATININE-BSD FRML MDRD: ABNORMAL ML/MIN/{1.73_M2}
GLUCOSE BLD-MCNC: 74 MG/DL (ref 70–99)
HCT VFR BLD CALC: 31.7 % (ref 36.3–47.1)
HEMOGLOBIN: 9.7 G/DL (ref 11.9–15.1)
IMMATURE GRANULOCYTES: 0 %
LYMPHOCYTES # BLD: 22 % (ref 24–43)
MCH RBC QN AUTO: 29.1 PG (ref 25.2–33.5)
MCHC RBC AUTO-ENTMCNC: 30.6 G/DL (ref 28.4–34.8)
MCV RBC AUTO: 95.2 FL (ref 82.6–102.9)
MONOCYTES # BLD: 11 % (ref 3–12)
NRBC AUTOMATED: 0 PER 100 WBC
PDW BLD-RTO: 14.7 % (ref 11.8–14.4)
PLATELET # BLD: 483 K/UL (ref 138–453)
PLATELET ESTIMATE: ABNORMAL
PMV BLD AUTO: 10.3 FL (ref 8.1–13.5)
POTASSIUM SERPL-SCNC: 4.8 MMOL/L (ref 3.7–5.3)
RBC # BLD: 3.33 M/UL (ref 3.95–5.11)
RBC # BLD: ABNORMAL 10*6/UL
SEG NEUTROPHILS: 61 % (ref 36–65)
SEGMENTED NEUTROPHILS ABSOLUTE COUNT: 5.69 K/UL (ref 1.5–8.1)
SODIUM BLD-SCNC: 135 MMOL/L (ref 135–144)
TOTAL PROTEIN: 7.3 G/DL (ref 6.4–8.3)
WBC # BLD: 9.2 K/UL (ref 3.5–11.3)
WBC # BLD: ABNORMAL 10*3/UL

## 2021-01-06 PROCEDURE — 80053 COMPREHEN METABOLIC PANEL: CPT

## 2021-01-06 PROCEDURE — 82248 BILIRUBIN DIRECT: CPT

## 2021-01-06 PROCEDURE — 85025 COMPLETE CBC W/AUTO DIFF WBC: CPT

## 2023-12-06 ENCOUNTER — APPOINTMENT (OUTPATIENT)
Dept: GENERAL RADIOLOGY | Age: 78
End: 2023-12-06
Payer: MEDICARE

## 2023-12-06 ENCOUNTER — HOSPITAL ENCOUNTER (INPATIENT)
Age: 78
LOS: 3 days | Discharge: HOME | End: 2023-12-09
Attending: STUDENT IN AN ORGANIZED HEALTH CARE EDUCATION/TRAINING PROGRAM | Admitting: INTERNAL MEDICINE
Payer: MEDICARE

## 2023-12-06 DIAGNOSIS — J69.0 ASPIRATION PNEUMONITIS (HCC): Primary | ICD-10-CM

## 2023-12-06 DIAGNOSIS — J96.01 ACUTE RESPIRATORY FAILURE WITH HYPOXIA (HCC): ICD-10-CM

## 2023-12-06 PROBLEM — H57.9 EYE DISEASE: Status: ACTIVE | Noted: 2023-12-06

## 2023-12-06 PROBLEM — H35.30 MACULAR DEGENERATION: Status: ACTIVE | Noted: 2023-12-06

## 2023-12-06 LAB
ANION GAP SERPL CALCULATED.3IONS-SCNC: 14 MMOL/L (ref 9–17)
ANION GAP SERPL CALCULATED.3IONS-SCNC: 16 MMOL/L (ref 9–17)
BASOPHILS # BLD: 0 K/UL (ref 0–0.2)
BASOPHILS # BLD: 0.21 K/UL (ref 0–0.2)
BASOPHILS NFR BLD: 0 % (ref 0–2)
BASOPHILS NFR BLD: 1 % (ref 0–2)
BUN SERPL-MCNC: 25 MG/DL (ref 8–23)
BUN SERPL-MCNC: 27 MG/DL (ref 8–23)
BUN/CREAT SERPL: 30 (ref 9–20)
BUN/CREAT SERPL: 50 (ref 9–20)
CALCIUM SERPL-MCNC: 9.1 MG/DL (ref 8.6–10.4)
CALCIUM SERPL-MCNC: 9.3 MG/DL (ref 8.6–10.4)
CHLORIDE SERPL-SCNC: 91 MMOL/L (ref 98–107)
CHLORIDE SERPL-SCNC: 95 MMOL/L (ref 98–107)
CO2 SERPL-SCNC: 22 MMOL/L (ref 20–31)
CO2 SERPL-SCNC: 24 MMOL/L (ref 20–31)
CREAT SERPL-MCNC: 0.5 MG/DL (ref 0.5–0.9)
CREAT SERPL-MCNC: 0.9 MG/DL (ref 0.5–0.9)
EKG ATRIAL RATE: 144 BPM
EKG P AXIS: 68 DEGREES
EKG P-R INTERVAL: 132 MS
EKG Q-T INTERVAL: 278 MS
EKG QRS DURATION: 64 MS
EKG QTC CALCULATION (BAZETT): 430 MS
EKG R AXIS: 19 DEGREES
EKG T AXIS: 66 DEGREES
EKG VENTRICULAR RATE: 144 BPM
EOSINOPHIL # BLD: 0 K/UL (ref 0–0.44)
EOSINOPHIL # BLD: 0 K/UL (ref 0–0.44)
EOSINOPHILS RELATIVE PERCENT: 0 % (ref 1–4)
EOSINOPHILS RELATIVE PERCENT: 0 % (ref 1–4)
ERYTHROCYTE [DISTWIDTH] IN BLOOD BY AUTOMATED COUNT: 14 % (ref 11.8–14.4)
ERYTHROCYTE [DISTWIDTH] IN BLOOD BY AUTOMATED COUNT: 14.1 % (ref 11.8–14.4)
FLUAV AG SPEC QL: NEGATIVE
FLUBV AG SPEC QL: NEGATIVE
GFR SERPL CREATININE-BSD FRML MDRD: >60 ML/MIN/1.73M2
GFR SERPL CREATININE-BSD FRML MDRD: >60 ML/MIN/1.73M2
GLUCOSE SERPL-MCNC: 116 MG/DL (ref 70–99)
GLUCOSE SERPL-MCNC: 124 MG/DL (ref 70–99)
HCT VFR BLD AUTO: 35 % (ref 36.3–47.1)
HCT VFR BLD AUTO: 40 % (ref 36.3–47.1)
HGB BLD-MCNC: 11.7 G/DL (ref 11.9–15.1)
HGB BLD-MCNC: 13 G/DL (ref 11.9–15.1)
IMM GRANULOCYTES # BLD AUTO: 0 K/UL (ref 0–0.3)
IMM GRANULOCYTES # BLD AUTO: 0 K/UL (ref 0–0.3)
IMM GRANULOCYTES NFR BLD: 0 %
IMM GRANULOCYTES NFR BLD: 0 %
LYMPHOCYTES NFR BLD: 0.85 K/UL (ref 1.1–3.7)
LYMPHOCYTES NFR BLD: 3.51 K/UL (ref 1.1–3.7)
LYMPHOCYTES RELATIVE PERCENT: 14 % (ref 24–43)
LYMPHOCYTES RELATIVE PERCENT: 4 % (ref 24–43)
MCH RBC QN AUTO: 30.4 PG (ref 25.2–33.5)
MCH RBC QN AUTO: 30.6 PG (ref 25.2–33.5)
MCHC RBC AUTO-ENTMCNC: 32.5 G/DL (ref 28.4–34.8)
MCHC RBC AUTO-ENTMCNC: 33.4 G/DL (ref 28.4–34.8)
MCV RBC AUTO: 91.6 FL (ref 82.6–102.9)
MCV RBC AUTO: 93.5 FL (ref 82.6–102.9)
MONOCYTES NFR BLD: 0.5 K/UL (ref 0.1–1.2)
MONOCYTES NFR BLD: 0.64 K/UL (ref 0.1–1.2)
MONOCYTES NFR BLD: 2 % (ref 3–12)
MONOCYTES NFR BLD: 3 % (ref 3–12)
MORPHOLOGY: NORMAL
MORPHOLOGY: NORMAL
NEUTROPHILS NFR BLD: 84 % (ref 36–65)
NEUTROPHILS NFR BLD: 92 % (ref 36–65)
NEUTS SEG NFR BLD: 19.6 K/UL (ref 1.5–8.1)
NEUTS SEG NFR BLD: 21.09 K/UL (ref 1.5–8.1)
NRBC BLD-RTO: 0 PER 100 WBC
NRBC BLD-RTO: 0 PER 100 WBC
PLATELET # BLD AUTO: 270 K/UL (ref 138–453)
PLATELET # BLD AUTO: 322 K/UL (ref 138–453)
PMV BLD AUTO: 9.3 FL (ref 8.1–13.5)
PMV BLD AUTO: 9.9 FL (ref 8.1–13.5)
POTASSIUM SERPL-SCNC: 4.1 MMOL/L (ref 3.7–5.3)
POTASSIUM SERPL-SCNC: 4.3 MMOL/L (ref 3.7–5.3)
RBC # BLD AUTO: 3.82 M/UL (ref 3.95–5.11)
RBC # BLD AUTO: 4.28 M/UL (ref 3.95–5.11)
SARS-COV-2 RDRP RESP QL NAA+PROBE: NOT DETECTED
SODIUM SERPL-SCNC: 129 MMOL/L (ref 135–144)
SODIUM SERPL-SCNC: 133 MMOL/L (ref 135–144)
SPECIMEN DESCRIPTION: NORMAL
WBC OTHER # BLD: 21.3 K/UL (ref 3.5–11.3)
WBC OTHER # BLD: 25.1 K/UL (ref 3.5–11.3)

## 2023-12-06 PROCEDURE — 71046 X-RAY EXAM CHEST 2 VIEWS: CPT

## 2023-12-06 PROCEDURE — 80048 BASIC METABOLIC PNL TOTAL CA: CPT

## 2023-12-06 PROCEDURE — 71045 X-RAY EXAM CHEST 1 VIEW: CPT

## 2023-12-06 PROCEDURE — 2580000003 HC RX 258

## 2023-12-06 PROCEDURE — 6370000000 HC RX 637 (ALT 250 FOR IP): Performed by: INTERNAL MEDICINE

## 2023-12-06 PROCEDURE — 94664 DEMO&/EVAL PT USE INHALER: CPT

## 2023-12-06 PROCEDURE — 94640 AIRWAY INHALATION TREATMENT: CPT

## 2023-12-06 PROCEDURE — 92610 EVALUATE SWALLOWING FUNCTION: CPT

## 2023-12-06 PROCEDURE — 93010 ELECTROCARDIOGRAM REPORT: CPT | Performed by: INTERNAL MEDICINE

## 2023-12-06 PROCEDURE — C9113 INJ PANTOPRAZOLE SODIUM, VIA: HCPCS

## 2023-12-06 PROCEDURE — 6360000002 HC RX W HCPCS

## 2023-12-06 PROCEDURE — 94761 N-INVAS EAR/PLS OXIMETRY MLT: CPT

## 2023-12-06 PROCEDURE — 2700000000 HC OXYGEN THERAPY PER DAY

## 2023-12-06 PROCEDURE — 87635 SARS-COV-2 COVID-19 AMP PRB: CPT

## 2023-12-06 PROCEDURE — 1200000000 HC SEMI PRIVATE

## 2023-12-06 PROCEDURE — 87040 BLOOD CULTURE FOR BACTERIA: CPT

## 2023-12-06 PROCEDURE — 85025 COMPLETE CBC W/AUTO DIFF WBC: CPT

## 2023-12-06 PROCEDURE — 6370000000 HC RX 637 (ALT 250 FOR IP): Performed by: STUDENT IN AN ORGANIZED HEALTH CARE EDUCATION/TRAINING PROGRAM

## 2023-12-06 PROCEDURE — 6360000002 HC RX W HCPCS: Performed by: STUDENT IN AN ORGANIZED HEALTH CARE EDUCATION/TRAINING PROGRAM

## 2023-12-06 PROCEDURE — 36415 COLL VENOUS BLD VENIPUNCTURE: CPT

## 2023-12-06 PROCEDURE — 6370000000 HC RX 637 (ALT 250 FOR IP)

## 2023-12-06 PROCEDURE — 93005 ELECTROCARDIOGRAM TRACING: CPT | Performed by: STUDENT IN AN ORGANIZED HEALTH CARE EDUCATION/TRAINING PROGRAM

## 2023-12-06 PROCEDURE — 96374 THER/PROPH/DIAG INJ IV PUSH: CPT

## 2023-12-06 PROCEDURE — A4216 STERILE WATER/SALINE, 10 ML: HCPCS

## 2023-12-06 PROCEDURE — 94669 MECHANICAL CHEST WALL OSCILL: CPT

## 2023-12-06 PROCEDURE — 87804 INFLUENZA ASSAY W/OPTIC: CPT

## 2023-12-06 PROCEDURE — 99285 EMERGENCY DEPT VISIT HI MDM: CPT

## 2023-12-06 RX ORDER — ENOXAPARIN SODIUM 100 MG/ML
40 INJECTION SUBCUTANEOUS DAILY
Status: DISCONTINUED | OUTPATIENT
Start: 2023-12-06 | End: 2023-12-09 | Stop reason: HOSPADM

## 2023-12-06 RX ORDER — LEVOTHYROXINE SODIUM 0.12 MG/1
125 TABLET ORAL DAILY
Status: DISCONTINUED | OUTPATIENT
Start: 2023-12-06 | End: 2023-12-09 | Stop reason: HOSPADM

## 2023-12-06 RX ORDER — MULTIVITAMIN WITH IRON
1 TABLET ORAL DAILY
Status: DISCONTINUED | OUTPATIENT
Start: 2023-12-06 | End: 2023-12-09 | Stop reason: HOSPADM

## 2023-12-06 RX ORDER — SODIUM CHLORIDE 0.9 % (FLUSH) 0.9 %
10 SYRINGE (ML) INJECTION PRN
Status: DISCONTINUED | OUTPATIENT
Start: 2023-12-06 | End: 2023-12-09 | Stop reason: HOSPADM

## 2023-12-06 RX ORDER — ACETAMINOPHEN 325 MG/1
650 TABLET ORAL EVERY 6 HOURS PRN
Status: DISCONTINUED | OUTPATIENT
Start: 2023-12-06 | End: 2023-12-09 | Stop reason: HOSPADM

## 2023-12-06 RX ORDER — MULTIVITAMIN WITH IRON
1 TABLET ORAL DAILY
Status: DISCONTINUED | OUTPATIENT
Start: 2023-12-06 | End: 2023-12-06

## 2023-12-06 RX ORDER — SODIUM CHLORIDE 9 MG/ML
INJECTION, SOLUTION INTRAVENOUS PRN
Status: DISCONTINUED | OUTPATIENT
Start: 2023-12-06 | End: 2023-12-09 | Stop reason: HOSPADM

## 2023-12-06 RX ORDER — IPRATROPIUM BROMIDE AND ALBUTEROL SULFATE 2.5; .5 MG/3ML; MG/3ML
1 SOLUTION RESPIRATORY (INHALATION)
Status: DISCONTINUED | OUTPATIENT
Start: 2023-12-06 | End: 2023-12-09 | Stop reason: HOSPADM

## 2023-12-06 RX ORDER — IPRATROPIUM BROMIDE AND ALBUTEROL SULFATE 2.5; .5 MG/3ML; MG/3ML
1 SOLUTION RESPIRATORY (INHALATION) EVERY 4 HOURS PRN
Status: DISCONTINUED | OUTPATIENT
Start: 2023-12-06 | End: 2023-12-06

## 2023-12-06 RX ORDER — POTASSIUM CHLORIDE 20 MEQ/1
40 TABLET, EXTENDED RELEASE ORAL PRN
Status: DISCONTINUED | OUTPATIENT
Start: 2023-12-06 | End: 2023-12-09 | Stop reason: HOSPADM

## 2023-12-06 RX ORDER — IPRATROPIUM BROMIDE AND ALBUTEROL SULFATE 2.5; .5 MG/3ML; MG/3ML
1 SOLUTION RESPIRATORY (INHALATION)
Status: DISCONTINUED | OUTPATIENT
Start: 2023-12-06 | End: 2023-12-06

## 2023-12-06 RX ORDER — SODIUM CHLORIDE 9 MG/ML
INJECTION, SOLUTION INTRAVENOUS CONTINUOUS
Status: DISCONTINUED | OUTPATIENT
Start: 2023-12-06 | End: 2023-12-08

## 2023-12-06 RX ORDER — ALBUTEROL SULFATE 2.5 MG/3ML
2.5 SOLUTION RESPIRATORY (INHALATION) EVERY 4 HOURS PRN
Status: DISCONTINUED | OUTPATIENT
Start: 2023-12-06 | End: 2023-12-09 | Stop reason: HOSPADM

## 2023-12-06 RX ORDER — CAFFEINE CITRATE 20 MG/ML
10 SOLUTION ORAL DAILY
Status: DISCONTINUED | OUTPATIENT
Start: 2023-12-06 | End: 2023-12-09 | Stop reason: HOSPADM

## 2023-12-06 RX ORDER — KETOROLAC TROMETHAMINE 30 MG/ML
30 INJECTION, SOLUTION INTRAMUSCULAR; INTRAVENOUS ONCE
Status: COMPLETED | OUTPATIENT
Start: 2023-12-06 | End: 2023-12-06

## 2023-12-06 RX ORDER — FAMOTIDINE 20 MG/1
20 TABLET, FILM COATED ORAL 2 TIMES DAILY
Status: DISCONTINUED | OUTPATIENT
Start: 2023-12-06 | End: 2023-12-06

## 2023-12-06 RX ORDER — ONDANSETRON 4 MG/1
4 TABLET, ORALLY DISINTEGRATING ORAL EVERY 8 HOURS PRN
Status: DISCONTINUED | OUTPATIENT
Start: 2023-12-06 | End: 2023-12-09 | Stop reason: HOSPADM

## 2023-12-06 RX ORDER — SODIUM CHLORIDE 0.9 % (FLUSH) 0.9 %
10 SYRINGE (ML) INJECTION EVERY 12 HOURS SCHEDULED
Status: DISCONTINUED | OUTPATIENT
Start: 2023-12-06 | End: 2023-12-09 | Stop reason: HOSPADM

## 2023-12-06 RX ORDER — FAMOTIDINE 20 MG/1
20 TABLET, FILM COATED ORAL DAILY
Status: DISCONTINUED | OUTPATIENT
Start: 2023-12-07 | End: 2023-12-09 | Stop reason: HOSPADM

## 2023-12-06 RX ORDER — PREDNISONE 10 MG/1
20 TABLET ORAL DAILY
Status: ON HOLD | COMMUNITY
End: 2023-12-07 | Stop reason: HOSPADM

## 2023-12-06 RX ORDER — LEVOTHYROXINE SODIUM 0.12 MG/1
125 TABLET ORAL DAILY
Status: DISCONTINUED | OUTPATIENT
Start: 2023-12-06 | End: 2023-12-06

## 2023-12-06 RX ORDER — POTASSIUM CHLORIDE 7.45 MG/ML
10 INJECTION INTRAVENOUS PRN
Status: DISCONTINUED | OUTPATIENT
Start: 2023-12-06 | End: 2023-12-09 | Stop reason: HOSPADM

## 2023-12-06 RX ORDER — CHLORAL HYDRATE 500 MG
1 CAPSULE ORAL 4 TIMES DAILY
Status: DISCONTINUED | OUTPATIENT
Start: 2023-12-06 | End: 2023-12-06

## 2023-12-06 RX ORDER — PANTOPRAZOLE SODIUM 40 MG/1
40 TABLET, DELAYED RELEASE ORAL
Status: DISCONTINUED | OUTPATIENT
Start: 2023-12-06 | End: 2023-12-06

## 2023-12-06 RX ORDER — POLYETHYLENE GLYCOL 3350 17 G/17G
17 POWDER, FOR SOLUTION ORAL DAILY PRN
Status: DISCONTINUED | OUTPATIENT
Start: 2023-12-06 | End: 2023-12-09 | Stop reason: HOSPADM

## 2023-12-06 RX ORDER — FLUTICASONE PROPIONATE 50 MCG
2 SPRAY, SUSPENSION (ML) NASAL DAILY
Status: DISCONTINUED | OUTPATIENT
Start: 2023-12-06 | End: 2023-12-09 | Stop reason: HOSPADM

## 2023-12-06 RX ORDER — PREDNISONE 20 MG/1
20 TABLET ORAL DAILY
Status: DISCONTINUED | OUTPATIENT
Start: 2023-12-06 | End: 2023-12-09 | Stop reason: HOSPADM

## 2023-12-06 RX ORDER — FAMOTIDINE 40 MG/1
40 TABLET, FILM COATED ORAL DAILY
Status: ON HOLD | COMMUNITY
End: 2023-12-07 | Stop reason: HOSPADM

## 2023-12-06 RX ORDER — ALBUTEROL SULFATE 2.5 MG/3ML
2.5 SOLUTION RESPIRATORY (INHALATION)
Status: DISCONTINUED | OUTPATIENT
Start: 2023-12-06 | End: 2023-12-06

## 2023-12-06 RX ORDER — ONDANSETRON 2 MG/ML
4 INJECTION INTRAMUSCULAR; INTRAVENOUS EVERY 6 HOURS PRN
Status: DISCONTINUED | OUTPATIENT
Start: 2023-12-06 | End: 2023-12-09 | Stop reason: HOSPADM

## 2023-12-06 RX ORDER — ALBUTEROL SULFATE 2.5 MG/3ML
15 SOLUTION RESPIRATORY (INHALATION)
Status: DISCONTINUED | OUTPATIENT
Start: 2023-12-06 | End: 2023-12-06

## 2023-12-06 RX ORDER — ACETAMINOPHEN 650 MG/1
650 SUPPOSITORY RECTAL EVERY 6 HOURS PRN
Status: DISCONTINUED | OUTPATIENT
Start: 2023-12-06 | End: 2023-12-09 | Stop reason: HOSPADM

## 2023-12-06 RX ADMIN — LEVOTHYROXINE SODIUM 125 MCG: 125 TABLET ORAL at 07:03

## 2023-12-06 RX ADMIN — SODIUM CHLORIDE 1500 MG: 9 INJECTION, SOLUTION INTRAVENOUS at 12:56

## 2023-12-06 RX ADMIN — IPRATROPIUM BROMIDE AND ALBUTEROL SULFATE 1 DOSE: .5; 3 SOLUTION RESPIRATORY (INHALATION) at 10:39

## 2023-12-06 RX ADMIN — SODIUM CHLORIDE: 9 INJECTION, SOLUTION INTRAVENOUS at 18:59

## 2023-12-06 RX ADMIN — FAMOTIDINE 20 MG: 20 TABLET ORAL at 09:48

## 2023-12-06 RX ADMIN — SODIUM CHLORIDE: 9 INJECTION, SOLUTION INTRAVENOUS at 04:50

## 2023-12-06 RX ADMIN — KETOROLAC TROMETHAMINE 30 MG: 30 INJECTION, SOLUTION INTRAMUSCULAR; INTRAVENOUS at 02:19

## 2023-12-06 RX ADMIN — THERA TABS 1 TABLET: TAB at 09:48

## 2023-12-06 RX ADMIN — FLUTICASONE PROPIONATE 2 SPRAY: 50 SPRAY, METERED NASAL at 09:48

## 2023-12-06 RX ADMIN — SODIUM CHLORIDE 1500 MG: 9 INJECTION, SOLUTION INTRAVENOUS at 07:02

## 2023-12-06 RX ADMIN — PANTOPRAZOLE SODIUM 40 MG: 40 INJECTION, POWDER, FOR SOLUTION INTRAVENOUS at 09:48

## 2023-12-06 RX ADMIN — ENOXAPARIN SODIUM 40 MG: 100 INJECTION SUBCUTANEOUS at 09:48

## 2023-12-06 RX ADMIN — IPRATROPIUM BROMIDE AND ALBUTEROL SULFATE 1 DOSE: .5; 3 SOLUTION RESPIRATORY (INHALATION) at 15:45

## 2023-12-06 RX ADMIN — SODIUM CHLORIDE 1500 MG: 9 INJECTION, SOLUTION INTRAVENOUS at 19:15

## 2023-12-06 RX ADMIN — IPRATROPIUM BROMIDE AND ALBUTEROL SULFATE 1 DOSE: .5; 3 SOLUTION RESPIRATORY (INHALATION) at 20:18

## 2023-12-06 RX ADMIN — IPRATROPIUM BROMIDE AND ALBUTEROL SULFATE 1 DOSE: .5; 3 SOLUTION RESPIRATORY (INHALATION) at 02:11

## 2023-12-06 RX ADMIN — PREDNISONE 20 MG: 20 TABLET ORAL at 09:48

## 2023-12-06 ASSESSMENT — PAIN - FUNCTIONAL ASSESSMENT: PAIN_FUNCTIONAL_ASSESSMENT: NONE - DENIES PAIN

## 2023-12-06 ASSESSMENT — LIFESTYLE VARIABLES
HOW OFTEN DO YOU HAVE A DRINK CONTAINING ALCOHOL: NEVER
HOW MANY STANDARD DRINKS CONTAINING ALCOHOL DO YOU HAVE ON A TYPICAL DAY: PATIENT DOES NOT DRINK
HOW OFTEN DO YOU HAVE A DRINK CONTAINING ALCOHOL: NEVER
HOW MANY STANDARD DRINKS CONTAINING ALCOHOL DO YOU HAVE ON A TYPICAL DAY: PATIENT DOES NOT DRINK

## 2023-12-06 NOTE — PROGRESS NOTES
Comprehensive Nutrition Assessment    Type and Reason for Visit:  Initial, Consult    Nutrition Recommendations/Plan:   Follow SLP recommendations  Increase feeds to 350 ml Jevity 1.2 tid with 60 ml water bolus if unable to take PO fluids     Malnutrition Assessment:  Malnutrition Status: At risk for malnutrition (Comment) (12/06/23 9972)    Context:  Acute Illness     Findings of the 6 clinical characteristics of malnutrition:  Energy Intake:  Mild decrease in energy intake (Comment) (with aspiration)  Weight Loss:  No significant weight loss     Body Fat Loss:  No significant body fat loss     Muscle Mass Loss:  No significant muscle mass loss    Fluid Accumulation:  No significant fluid accumulation     Strength:  Not Performed    Nutrition Assessment:    Predicted suboptimal nutrient intakes r/t altered GI status, AEB lower intakes of EN than estimated needs in home environment. States originally supposed to use 4.5 tetra bricks of Jevity but only using 3 daily. Is uncertain of caloric density of Jevity at home (hospital has 1.2). States weight stability at 135# over last 3 years (was 148# prior to that). No usual GI issues for n/v/d or constipation. Mild hyponatremia whether from GI losses (emesis) or excess free water consumption pta (not excessive sounding per interview); is improving. If unable to take PO fluids would recommend to increase EN to 350 ml tid Jevity 1.2 tid with increased flushes of at least 60 ml before and after bolus for hydration and tube patency. Is currently receiving NS at 75 ml/hr for sodium correction/hydration. If using Jevity 1.5 at home, lesser volume would be appropriate but will require increases in free water for hydration if not taking PO. Nutrition Related Findings:    no edema, + b/s and flatus.  Wound Type: None       Current Nutrition Intake & Therapies:    Average Meal Intake: NPO  Average Supplements Intake: NPO  Current Tube Feeding (TF) Orders:  Feeding Route: for: \"VITD25\"    Nutrition Interventions:   Food and/or Nutrient Delivery: Modify Tube Feeding  Nutrition Education/Counseling: Education initiated  Coordination of Nutrition Care: Continue to monitor while inpatient  Plan of Care discussed with: patient    Goals:     Goals:  Tolerate nutrition support at goal rate       Nutrition Monitoring and Evaluation:   Behavioral-Environmental Outcomes: None Identified  Food/Nutrient Intake Outcomes: Enteral Nutrition Intake/Tolerance  Physical Signs/Symptoms Outcomes: Chewing or Swallowing, Biochemical Data, Weight, GI Status    Discharge Planning:    Enteral Nutrition     Emelina Coe LD  Contact: [phone]

## 2023-12-06 NOTE — PROGRESS NOTES
30.4 30.6   MCHC 32.5 33.4   RDW 14.1 14.0    270   MPV 9.9 9.3        Last 3 Blood Glucose:   Recent Labs     12/06/23  0209 12/06/23  0530   GLUCOSE 116* 124*        Comprehensive Metabolic Profile:   Recent Labs     12/06/23  0209 12/06/23  0530   * 133*   K 4.1 4.3   CL 91* 95*   CO2 22 24   BUN 25* 27*   CREATININE 0.5 0.9   GLUCOSE 116* 124*   CALCIUM 9.1 9.3          Radiology/Imaging:  XR CHEST PORTABLE   Final Result   1. Hyperdense nodular appearing foci in the right mid and lower lung field,   with patchy interstitial infiltrates seen in the lungs bilaterally, findings   of which may be related to aspiration. 2. No pneumothorax. ASSESSMENT / PLAN:    MEDICAL DECISION MAKING:    Primary Problem(s): Aspiration pneumonitis (HCC)  Differential diagnoses: Pneumonia  Condition is an undiagnosed new problem with uncertain prognosis  Condition is stable  Treatment plan:   CBC, BMP, blood cultures x2  N.p.o.  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 I discussed that her last empyema was on the left. He stated nothing to do with it. Medications:   IV fluids  IV Unasyn  Nebs  Medication Monitoring / High Risk Medications: none     Nutrition status:   at risk for malnutrition  Dietician consult initiated     Hospital Prophylaxis:   DVT: Lovenox   Stress Ulcer: H2 blocker, PPI     Disposition:  Shared decision making:  All test results, treatment options and disposition options were discussed with the patient today  Social determinants of health that may impact management: none  Code status: Full Code   Disposition: Discharge plan is pending    [facility] Advanced Care Planning documentation:  [x] I have confirmed that the patient's Advance Care Plan is present, Code Status is documented, or surrogate decision maker is listed in the patient's medical record  [If \"yes\", STOP HERE]     [] The patient's Advance Care Plan is NOT present because:    []  I confirmed today that the patient does not wish or was not able to name a   surrogate decision maker or provide and advance care plan.    [] Hospice care is currently being provided or has been provided within the   calendar year. []  I did NOT confirm today the presence of an [address] [place] [address] Street or surrogate   decision maker documented within the patient's medical record.    [DOES NOT SATISFY [address] - CNP , SNOW, NP-C  Hospitalist Medicine        12/6/2023, 7:25 AM

## 2023-12-06 NOTE — PROGRESS NOTES
Guillaume                                               Herbal Suspension    To avoid potential drug interactions with herbal and nutritional supplements, it is the policy of [facility] [address] to suspend all orders for these products at the time of admission.     Per hospital policy the following herbal/nutritional supplements were suspended during the hospital stay:    Fish oil    Thank you,    Gerhardt Bergamo, [facility], 12/6/2023, 7:00 AM

## 2023-12-06 NOTE — PROGRESS NOTES
Entered patient's room for morning vital signs and head to toe assessment. Patient resting in the bed at this time. A&O x4, calm, and cooperative. Patient denies of pain at this time. Vital signs and head to toe assessment completed at this time, see flowsheets for more details. Patient's oxygen liters decreased at this time, see flowsheets. PEG tube assessed at this time, skin surrounding is red, painful, and has some yellow thick drainage. New dressing applied at this time, split gauze. Patient denies no more needs at this time. Call light within reach. Bed alarm on. Bed wheels locked. Bed in lowest position.

## 2023-12-06 NOTE — PROGRESS NOTES
Patient admitted to [facility] room 311. Patient is alert and oriented x4- calm and cooperative with assessment and admission navigator- home medications reconciled at this time. Patient currently denies any pain and discomfort. Lung fields clear bilaterally, respiration unlabored- dyspnea noted when ambulating but quickly recovered. Current spo2 saturation of 95% on 3 L nasal cannula. Denying any additional needs, call light placed within reach, bed in lowest position and alarm engaged to promote patient safety. Writer encourages use of call light for assistance. Plan of care on going .

## 2023-12-06 NOTE — CARE COORDINATION
Case Management Assessment  Initial Evaluation    Date/Time of Evaluation: 12/6/2023 12:42 PM  Assessment Completed by: RON Murphy    If patient is discharged prior to next notation, then this note serves as note for discharge by case management. Patient Name: Tram Wheeler                   YOB: 1945  Diagnosis: Aspiration pneumonitis [facility]) [J69.0]                   Date / Time: 12/6/2023  1:53 AM    Patient Admission Status: Inpatient   Readmission Risk (Low < 19, Mod (19-27), High > 27): Readmission Risk Score: 13.9    Current PCP: Rachna James MD  PCP verified by CM? Yes    Chart Reviewed: Yes      History Provided by: Patient  Patient Orientation: Alert and Oriented, Person, Place, Situation, Self    Patient Cognition: Alert    Hospitalization in the last 30 days (Readmission):  No    If yes, Readmission Assessment in  Navigator will be completed. Advance Directives:      Code Status: Full Code   Patient's Primary Decision Maker is: Legal Next of Kin    Primary Decision Maker: Greg Hill - Spouse - [phone]    Discharge Planning:    Patient lives with: Spouse/Significant Other Type of Home: House  Primary Care Giver: Self  Patient Support Systems include: Spouse/Significant Other, Family Members, Friends/Neighbors   Current Financial resources: Medicare  Current community resources: None  Current services prior to admission: None            Current DME:              Type of Home Care services:  None    ADLS  Prior functional level: Independent in ADLs/IADLs  Current functional level: Independent in ADLs/IADLs    PT AM-PAC:   /24  OT AM-PAC:   /24    Family can provide assistance at DC: Yes  Would you like Case Management to discuss the discharge plan with any other family members/significant others, and if so, who?  No  Plans to Return to Present Housing: Yes  Other Identified Issues/Barriers to RETURNING to current housing: None Noted  Potential Assistance needed at discharge: N/A            Potential DME:    Patient expects to discharge to: [id number] [place] [address] for transportation at discharge: Family    Financial    Payor: Clifton Cage / Plan: MEDICARE PART A AND B / Product Type: *No Product type* /     Does insurance require precert for SNF: No    Potential assistance Purchasing Medications: No  Meds-to-Beds request:        [address] [facility] [id number], [address] [facility] [phone] Rashawn Mendoza [phone]  [address] [facility]  [address] [address]  Phone: [phone] Fax: [phone]      Notes:    Factors facilitating achievement of predicted outcomes: Family support, Motivated, Cooperative, Pleasant, and Good insight into deficits    Barriers to discharge: Medical complications    Additional Case Management Notes: Met with Patient this a.m. to discuss discharge planning. Patient is a 66year old , white female, admitted with a diagnosis of Aspiration Pneumonitis. Patient is alert and oriented, pleasant and cooperative with this assessment. States that her plan is to return home with her spouse when deemed medically stable for discharge. Patient resides in [facility], near [facility], with her . She has tube feedings but is allowed to drink, per her own report. No other DME noted. Patient utilizes no outside resources or services. Family provides for her transportation needs as with her vision trouble she no longer can drive. PCP is Dr. Stephan Fischer MD.  Patient has medical insurance and denies needing financial assistance with the cost of her prescription medications. Patient is a 'Full Code' status and reports that she does have medical directives in place. The Plan for Transition of Care is related to the following treatment goals of Aspiration pneumonitis ([address] [facility]) [K46.6]    IF APPLICABLE: The Patient and/or patient representative George Castañeda and her family were provided with a choice of provider and agrees with the discharge plan.  Freedom of choice list

## 2023-12-06 NOTE — PROGRESS NOTES
Patient's afternoon bolus feeding completed at this time. No residual noted. Patient brought extension tubing for PEG tube from home. Tubing attached at this time.

## 2023-12-06 NOTE — ED NOTES
Patient continues with restlessness. Patient took EKG patches, BP cuff, and pulse ox off and attempted to walk out of hospital for a cigarette. Security followed patient outside and brought patient back to room. Dr. Ria Metcalf aware. Patient with 1:1 sitter.        Kim Pavon RN  12/06/23 1288

## 2023-12-06 NOTE — PROGRESS NOTES
Spiritual Services Interventions  0311/0311-01   12/6/2023  Liliane CRAIG Ameena Armas  66y.o. year old female    Encounter Summary  Encounter Overview/Reason : (P) Initial Encounter  Service Provided For[de-identified] (P) Patient  Referral/Consult From[de-identified] (P) Rounding  Last Encounter : (P) 12/06/23 (sleeping)  Complexity of Encounter: (P) Moderate  Begin Time: (P) 1500  End Time : (P) 1510  Total Time Calculated: (P) 10 min     Spiritual/Emotional needs  Type: (P) Spiritual Support                    Assessment/Intervention/Outcome  Assessment: (P) Unable to assess  Intervention: (P) Prayer (assurance of)/[place]  Outcome: (P) Did not respond

## 2023-12-06 NOTE — PROGRESS NOTES
[facility]    Facility/Department: [facility] AT THE [facility] MED SURG    Speech Language Pathology    Clinical Bedside Swallow Evaluation    Joselyn Easley    : 1945 (74 y.o.)    MRN: [id number]    ROOM: [address] [place], [place] [address]-    ADMISSION DATE: 2023    PATIENT DIAGNOSIS(ES): Aspiration pneumonitis ([address]) [J69.0]    Chief Complaint   Patient presents with    Choking     Patient reports she was drinking hot cocoa approximately 9:30pm last night and choked with shortness of breath after choking and has not resolved. Patient arrived by squad with C-pap. Patient continue with cough and shortness of breath and tachycardia since arrival. Squad reported they started patient on c-pap and gave 3 breathing treatments. Patient Active Problem List    Diagnosis Date Noted    Aspiration pneumonitis ([address]) 2023    Eye disease 2023    Macular degeneration 2023    Acute respiratory failure with hypoxia ([address]) 2023       Past Medical History:   Diagnosis Date    Acute respiratory failure with hypoxia ([address]) 2023    Blindness     Left eye blindness    Macular degeneration     Right eye    Thyroid disease        Past Surgical History:   Procedure Laterality Date    FOOT SURGERY Bilateral     PILONIDAL CYST EXCISION         Allergies   Allergen Reactions    Sulfa Antibiotics Swelling    Avelox [Moxifloxacin] Rash       DATE ONSET: 2023    Date of Evaluation: 2023    Evaluating Therapist: SHANTELL Serra    Dysphagia Diagnosis    Dysphagia Diagnosis: Concerns for esophageal stage dysphagia; Severe pharyngeal stage dysphagia;Mild oral stage dysphagia    Recommended Diet    Recommendations: Dysphagia treatment; Modified barium swallow study  Referral To: GI    Diet Solids Recommendation: NPO    Liquid Consistency Recommendation: NPO    Recommended Form of Meds: Via alternative means of nutrition      Reason for Referral    Avery Armas was referred for a bedside swallow evaluation to assess the demonstrates functional strength, ROM, and coordination of labial and lingual musculature. Only thin liquids via spoon trialed based on results of previous MBSS and patient's baseline of only drinking thin liquids. Patient demonstrated functional acceptance and bolus propulsion of thin liquids via spoon. Patient presents with moderate-severe pharyngeal phase dysphagia. Patient demonstrated suspected reduced laryngeal elevation upon palpation and suspected delayed swallow initiation. Patient demonstrated throat clears with thin liquids via teaspoon. Patient also states \"It feels like it's just sitting there. \" Patient was inconsistently able to clear sensation with use of chin tuck, multiple swallows, effortful swallows, and supraglottic swallows. ST recommends continued NPO status with alternative means of nutrition/hydration via PEG tube. Due to previous MBSS, ST suspects patient is experiencing some degree of esophageal dysphagia in addition to oropharyngeal dysphagia. Patient would benefit from GI consult for further assessment. Patient would also benefit from repeat MBSS to objectively assess pharyngeal phase of swallowing and determine if PO diet is safe. ST will continue to follow patient daily during inpatient stay.               Electronically signed: Kat Casey             12/6/2023

## 2023-12-06 NOTE — H&P
History and Physical    Patient:  Nicki Swartz  MRN: [id number]    Chief Complaint: Choking episode    History Obtained From:  patient, electronic medical record    PCP: Dae Laurent MD    History of Present Illness: The patient is a 66 y.o. female who presents with choking. Patient was drinking hot chocolate around 9:30 PM last night and choked. Patient states she has a large hiatal hernia and postnasal drainage. Patient stated that she needed to cough while taking a drink. She became short of breath. Upon EMS arrival patient's SPO2 was in the low 80s. She was started on CPAP. Upon arrival to the emergency department, high flow nasal cannula was added to the CPAP and patient was given several breathing treatments. She was febrile upon arrival with a temperature of 102.2. Patient's shortness of breath has improved. She is currently on nasal cannula at 2 L and is satting at 98%. Patient denies any chest pain or nausea. Patient states she had a similar issue to this in 2020. She had an empyema related to aspiration and ended up having a thoracotomy. She had a PEG tube placed following that episode. Patient currently does tube feedings 3 times a day. She is able to drink fluids. Past medical history includes thyroid disease, optic neuropathy, atrial tachycardia. WBC 25.1 with left shift, sodium 129, chloride 91, chest x-ray showed hyperdense nodular appearing foci in the right mid and lower lung field with patchy interstitial infiltrates seen in the lungs bilaterally which may be related to aspiration. Past Medical History:        Diagnosis Date    Eye disease     Thyroid disease        Past Surgical History:        Procedure Laterality Date    FOOT SURGERY Bilateral     PILONIDAL CYST EXCISION         Medications Prior to Admission:    Prior to Admission medications    Medication Sig Start Date End Date Taking?  Authorizing Provider   fluticasone (FLONASE) 50 MCG/ACT nasal spray 2 sprays by

## 2023-12-06 NOTE — PROGRESS NOTES
Evening bolus feeding completed at this time. 0 mL of residual noted. Patient tolerating feedings well.

## 2023-12-07 LAB
ANION GAP SERPL CALCULATED.3IONS-SCNC: 8 MMOL/L (ref 9–17)
BASOPHILS # BLD: <0.03 K/UL (ref 0–0.2)
BASOPHILS NFR BLD: 0 % (ref 0–2)
BUN SERPL-MCNC: 39 MG/DL (ref 8–23)
BUN/CREAT SERPL: 56 (ref 9–20)
CALCIUM SERPL-MCNC: 8.7 MG/DL (ref 8.6–10.4)
CHLORIDE SERPL-SCNC: 99 MMOL/L (ref 98–107)
CO2 SERPL-SCNC: 27 MMOL/L (ref 20–31)
CREAT SERPL-MCNC: 0.7 MG/DL (ref 0.5–0.9)
EOSINOPHIL # BLD: <0.03 K/UL (ref 0–0.44)
EOSINOPHILS RELATIVE PERCENT: 0 % (ref 1–4)
ERYTHROCYTE [DISTWIDTH] IN BLOOD BY AUTOMATED COUNT: 14.2 % (ref 11.8–14.4)
GFR SERPL CREATININE-BSD FRML MDRD: >60 ML/MIN/1.73M2
GLUCOSE SERPL-MCNC: 130 MG/DL (ref 70–99)
HCT VFR BLD AUTO: 31 % (ref 36.3–47.1)
HGB BLD-MCNC: 10.2 G/DL (ref 11.9–15.1)
IMM GRANULOCYTES # BLD AUTO: 0.07 K/UL (ref 0–0.3)
IMM GRANULOCYTES NFR BLD: 1 %
LYMPHOCYTES NFR BLD: 1.75 K/UL (ref 1.1–3.7)
LYMPHOCYTES RELATIVE PERCENT: 11 % (ref 24–43)
MCH RBC QN AUTO: 30.5 PG (ref 25.2–33.5)
MCHC RBC AUTO-ENTMCNC: 32.9 G/DL (ref 28.4–34.8)
MCV RBC AUTO: 92.8 FL (ref 82.6–102.9)
MONOCYTES NFR BLD: 0.66 K/UL (ref 0.1–1.2)
MONOCYTES NFR BLD: 4 % (ref 3–12)
NEUTROPHILS NFR BLD: 84 % (ref 36–65)
NEUTS SEG NFR BLD: 12.84 K/UL (ref 1.5–8.1)
NRBC BLD-RTO: 0 PER 100 WBC
PLATELET # BLD AUTO: 253 K/UL (ref 138–453)
PMV BLD AUTO: 9.4 FL (ref 8.1–13.5)
POTASSIUM SERPL-SCNC: 5.1 MMOL/L (ref 3.7–5.3)
RBC # BLD AUTO: 3.34 M/UL (ref 3.95–5.11)
SODIUM SERPL-SCNC: 134 MMOL/L (ref 135–144)
WBC OTHER # BLD: 15.4 K/UL (ref 3.5–11.3)

## 2023-12-07 PROCEDURE — 2700000000 HC OXYGEN THERAPY PER DAY

## 2023-12-07 PROCEDURE — 94664 DEMO&/EVAL PT USE INHALER: CPT

## 2023-12-07 PROCEDURE — 36415 COLL VENOUS BLD VENIPUNCTURE: CPT

## 2023-12-07 PROCEDURE — 94761 N-INVAS EAR/PLS OXIMETRY MLT: CPT

## 2023-12-07 PROCEDURE — 6360000002 HC RX W HCPCS

## 2023-12-07 PROCEDURE — 94640 AIRWAY INHALATION TREATMENT: CPT

## 2023-12-07 PROCEDURE — 1200000000 HC SEMI PRIVATE

## 2023-12-07 PROCEDURE — 6370000000 HC RX 637 (ALT 250 FOR IP): Performed by: INTERNAL MEDICINE

## 2023-12-07 PROCEDURE — 80048 BASIC METABOLIC PNL TOTAL CA: CPT

## 2023-12-07 PROCEDURE — 94669 MECHANICAL CHEST WALL OSCILL: CPT

## 2023-12-07 PROCEDURE — A4216 STERILE WATER/SALINE, 10 ML: HCPCS

## 2023-12-07 PROCEDURE — 92526 ORAL FUNCTION THERAPY: CPT

## 2023-12-07 PROCEDURE — 2580000003 HC RX 258

## 2023-12-07 PROCEDURE — 6370000000 HC RX 637 (ALT 250 FOR IP)

## 2023-12-07 PROCEDURE — 85025 COMPLETE CBC W/AUTO DIFF WBC: CPT

## 2023-12-07 PROCEDURE — C9113 INJ PANTOPRAZOLE SODIUM, VIA: HCPCS

## 2023-12-07 RX ORDER — LEVOTHYROXINE SODIUM 0.12 MG/1
125 TABLET ORAL DAILY
Qty: 30 TABLET | Refills: 3 | COMMUNITY
Start: 2023-12-07

## 2023-12-07 RX ORDER — MULTIVITAMIN WITH IRON
1 TABLET ORAL DAILY
Refills: 0 | COMMUNITY
Start: 2023-12-07

## 2023-12-07 RX ORDER — PREDNISONE 20 MG/1
20 TABLET ORAL DAILY
Qty: 10 TABLET | Refills: 0 | Status: SHIPPED | OUTPATIENT
Start: 2023-12-07 | End: 2023-12-17

## 2023-12-07 RX ORDER — AMOXICILLIN AND CLAVULANATE POTASSIUM 875; 125 MG/1; MG/1
1 TABLET, FILM COATED ORAL 2 TIMES DAILY
Qty: 20 TABLET | Refills: 0 | Status: SHIPPED | OUTPATIENT
Start: 2023-12-07 | End: 2023-12-09 | Stop reason: HOSPADM

## 2023-12-07 RX ADMIN — SODIUM CHLORIDE 1500 MG: 9 INJECTION, SOLUTION INTRAVENOUS at 01:25

## 2023-12-07 RX ADMIN — LEVOTHYROXINE SODIUM 125 MCG: 125 TABLET ORAL at 08:56

## 2023-12-07 RX ADMIN — SODIUM CHLORIDE 1500 MG: 9 INJECTION, SOLUTION INTRAVENOUS at 08:56

## 2023-12-07 RX ADMIN — METOPROLOL TARTRATE 25 MG: 25 TABLET, FILM COATED ORAL at 08:56

## 2023-12-07 RX ADMIN — IPRATROPIUM BROMIDE AND ALBUTEROL SULFATE 1 DOSE: .5; 3 SOLUTION RESPIRATORY (INHALATION) at 10:37

## 2023-12-07 RX ADMIN — PREDNISONE 20 MG: 20 TABLET ORAL at 08:56

## 2023-12-07 RX ADMIN — SODIUM CHLORIDE: 9 INJECTION, SOLUTION INTRAVENOUS at 09:56

## 2023-12-07 RX ADMIN — THERA TABS 1 TABLET: TAB at 08:56

## 2023-12-07 RX ADMIN — SODIUM CHLORIDE 1500 MG: 9 INJECTION, SOLUTION INTRAVENOUS at 19:15

## 2023-12-07 RX ADMIN — FLUTICASONE PROPIONATE 2 SPRAY: 50 SPRAY, METERED NASAL at 08:56

## 2023-12-07 RX ADMIN — ENOXAPARIN SODIUM 40 MG: 100 INJECTION SUBCUTANEOUS at 08:56

## 2023-12-07 RX ADMIN — IPRATROPIUM BROMIDE AND ALBUTEROL SULFATE 1 DOSE: .5; 3 SOLUTION RESPIRATORY (INHALATION) at 05:24

## 2023-12-07 RX ADMIN — SODIUM CHLORIDE: 9 INJECTION, SOLUTION INTRAVENOUS at 22:00

## 2023-12-07 RX ADMIN — IPRATROPIUM BROMIDE AND ALBUTEROL SULFATE 1 DOSE: .5; 3 SOLUTION RESPIRATORY (INHALATION) at 16:00

## 2023-12-07 RX ADMIN — IPRATROPIUM BROMIDE AND ALBUTEROL SULFATE 1 DOSE: .5; 3 SOLUTION RESPIRATORY (INHALATION) at 19:57

## 2023-12-07 RX ADMIN — PANTOPRAZOLE SODIUM 40 MG: 40 INJECTION, POWDER, FOR SOLUTION INTRAVENOUS at 08:54

## 2023-12-07 RX ADMIN — FAMOTIDINE 20 MG: 20 TABLET ORAL at 08:57

## 2023-12-07 RX ADMIN — SODIUM CHLORIDE 1500 MG: 9 INJECTION, SOLUTION INTRAVENOUS at 13:06

## 2023-12-07 NOTE — PROGRESS NOTES
Physician Progress Note      PATIENT:               Rajeev Hidalgo  CSN #:                  [id number]  :                       1945  ADMIT DATE:       2023 1:53 AM  DISCH DATE:  RESPONDING  PROVIDER #:        HOLLI COLEY          QUERY TEXT:      Pt admitted with aspiration pneumonitis. Pt noted to have WBC 25.1 with 21.09   neutrophils absolute, initial temp 102.2F, , RR 41    If possible, please document in the progress notes and discharge summary if   you are evaluating and /or treating any of the following: The medical record reflects the following:  Risk Factors: hx empyema related to aspiration/thoracotomy  Clinical Indicators: choking, SOB;    initial WBC 25.1 with 21.09 neutrophils   absolute, initial temp 102.2F, , RR 41  Treatment: Ave Cline, MSN, RN, CCDS, CRCR  Clinical   Options provided:  -- Sepsis, present on admission due to aspiration pneumonitis  -- Sepsis was ruled out  -- Other - I will add my own diagnosis  -- Disagree - Not applicable / Not valid  -- Disagree - Clinically unable to determine / Unknown  -- Refer to Clinical Documentation Reviewer    PROVIDER RESPONSE TEXT:    Provider is clinically unable to determine a response to this query.     Query created by: Hugo Cline on 2023 2:08 PM      Electronically signed by:  Nirmal Smith 2023 3:58 AM

## 2023-12-07 NOTE — DISCHARGE INSTRUCTIONS
Make sure you take the entire course of antibiotic Augmentin. Stay active.     Nebulizer treatments 3 times daily

## 2023-12-07 NOTE — PROGRESS NOTES
The patient expressed concerns about the change in amount of tube feeding she was receiving and I informed her that we had already reordered the 250ml per feed. I explained the reason her tube feed was increased was because we don't carry the same Jevity and she will be receiving less calories with that volume. She said \"I don't care, I want my normal amount\". I also informed her that we will using the pump over 30 minutes like she does at at home and she was pleased to hear that. The patient's oxygen saturation is 93% on 2L nasal cannula. Pt denies any further needs, call light within reach, will continue to monitor.

## 2023-12-07 NOTE — PROGRESS NOTES
Vitals and assessment completed at this time, see flowsheet for more details. Denies any pain or SOB at this time. All needs met at this time, call light within reach. Care ongoing.

## 2023-12-07 NOTE — PLAN OF CARE
Problem: Discharge Planning  Goal: Discharge to home or other facility with appropriate resources  Outcome: Progressing  Flowsheets (Taken 12/6/2023 1845)  Discharge to home or other facility with appropriate resources: Identify barriers to discharge with patient and caregiver     Problem: Safety - Adult  Goal: Free from fall injury  Outcome: Progressing     Problem: Nutrition Deficit:  Goal: Optimize nutritional status  12/6/2023 2207 by Clarisa Hastings RN  Outcome: Progressing  12/6/2023 0813 by Keshia Pina RD, LD  Outcome: Progressing  Flowsheets (Taken 12/6/2023 0813)  Nutrient intake appropriate for improving, restoring, or maintaining nutritional needs:   Monitor oral intake, labs, and treatment plans   Recommend, monitor, and adjust tube feedings and TPN/PPN based on assessed needs  Note: Nutrition Problem #1: Predicted inadequate energy intake  Intervention: Food and/or Nutrient Delivery: Modify Tube Feeding       Problem: Skin/Tissue Integrity  Goal: Absence of new skin breakdown  Description: 1. Monitor for areas of redness and/or skin breakdown  2. Assess vascular access sites hourly  3. Every 4-6 hours minimum:  Change oxygen saturation probe site  4. Every 4-6 hours:  If on nasal continuous positive airway pressure, respiratory therapy assess nares and determine need for appliance change or resting period.   Outcome: Progressing

## 2023-12-07 NOTE — DISCHARGE SUMMARY
Discharge Summary    Devi Quintero  :  1945  MRN:  [id number]    Admit date:  2023      Discharge date:   2023    Admitting Physician:  Paulette Be MD    Discharge Diagnoses:      Principal Problem:    Aspiration pneumonitis [facility])  Active Problems:    Eye disease    Macular degeneration    Acute respiratory failure with hypoxia ([address])  Resolved Problems:    * No resolved hospital problems.  *      Active Hospital Problems    Diagnosis Date Noted    Aspiration pneumonitis ([address]) [J69.0] 2023    Eye disease [H57.9] 2023    Macular degeneration [H35.30] 2023    Acute respiratory failure with hypoxia [facility]) [J96.01] 2023       Discharge Medications:         Medication List        START taking these medications      amoxicillin-clavulanate 875-125 MG per tablet  Commonly known as: AUGMENTIN  Take 1 tablet by mouth 2 times daily for 10 days     ipratropium 0.5 mg-albuterol 2.5 mg 0.5-2.5 (3) MG/3ML Soln nebulizer solution  Commonly known as: DUONEB  Inhale 3 mLs into the lungs in the morning, at noon, and at bedtime     multivitamin Tabs tablet  Replaces: multivitamin capsule     Nebulizer Misc  Nebulizer machine and supplies            CHANGE how you take these medications      Ascorbic Acid 125 MG Chew  What changed: how to take this     levothyroxine 125 MCG tablet  Commonly known as: SYNTHROID  What changed:   medication strength  how much to take  how to take this  when to take this     metoprolol tartrate 25 MG tablet  Commonly known as: LOPRESSOR  What changed: how to take this     predniSONE 20 MG tablet  Commonly known as: DELTASONE  1 tablet by Per G Tube route daily for 10 days  What changed:   medication strength  how to take this            CONTINUE taking these medications      caffeine citrate 20 MG/ML solution  Commonly known as: CAFCIT     fluticasone 50 MCG/ACT nasal spray  Commonly known as: FLONASE     omeprazole 20 MG delayed release capsule  Commonly known

## 2023-12-07 NOTE — PROGRESS NOTES
I was notified by other nurses that the patient had vomited and oxygen saturation dropped to high 70's. Patient was placed on 2L nasal cannula and is at 93%. I called Dr. Mcconnell Stands and the new orders to discontinue the discharge and lower the tube feeds to 250ml each bolus have been placed. He also said to skip the lunchtime bolus. Patient is sitting upright in chair with call light within reach, will continue to monitor.

## 2023-12-07 NOTE — PROGRESS NOTES
The patient complained that her stomach hurts and requested we stop the tube feed, therefore I did so. Total intake was 159. Patient moved to chair, call light within reach.

## 2023-12-07 NOTE — PROGRESS NOTES
[facility]    Facility/Department: [facility] AT THE [facility] MED SURG    Speech Language Pathology    Clinical Bedside Swallow Evaluation    Guy Ward    : 1945 (74 y.o.)    MRN: [id number]    ROOM: [facility]-    ADMISSION DATE: 2023    PATIENT DIAGNOSIS(ES): Aspiration pneumonitis ([address]) [J69.0]    Chief Complaint   Patient presents with    Choking     Patient reports she was drinking hot cocoa approximately 9:30pm last night and choked with shortness of breath after choking and has not resolved. Patient arrived by squad with C-pap. Patient continue with cough and shortness of breath and tachycardia since arrival. Squad reported they started patient on c-pap and gave 3 breathing treatments. Patient Active Problem List    Diagnosis Date Noted    Aspiration pneumonitis ([address]) 2023    Eye disease 2023    Macular degeneration 2023    Acute respiratory failure with hypoxia ([address]) 2023       Past Medical History:   Diagnosis Date    Acute respiratory failure with hypoxia ([address]) 2023    Blindness     Left eye blindness    Macular degeneration     Right eye    Thyroid disease        Past Surgical History:   Procedure Laterality Date    FOOT SURGERY Bilateral     PILONIDAL CYST EXCISION         Allergies   Allergen Reactions    Sulfa Antibiotics Swelling    Avelox [Moxifloxacin] Rash       DATE ONSET: 23    Date of Evaluation: 2023    Evaluating Therapist: SHANTELL Oliva    Dysphagia Diagnosis    Dysphagia Diagnosis: Concerns for esophageal stage dysphagia; Severe pharyngeal stage dysphagia;Mild oral stage dysphagia    Recommended Diet    Recommendations: Dysphagia treatment; Modified barium swallow study  Referral To: GI    Diet Solids Recommendation: NPO    Liquid Consistency Recommendation: NPO    Recommended Form of Meds: Via alternative means of nutrition         Reason for Referral    Brandy Loco was referred for a bedside swallow evaluation to assess approximately 7-8 years ago. Vision and Hearing    Vision  Vision: Within Functional Limits  Hearing  Hearing: Within functional limits    Current Diet level    Current Diet : NPO    Oral Motor    Labial: No impairment  Dentition: Upper dentures; Intact  Oral Hygiene: Xerostomia;Clean  Lingual: No impairment  Velum: No Impairment  Mandible: No impairment    Oral/Pharyngeal Phase    Oral Phase - Comment: Patient demonstrates functional strength, ROM, and coordination of labial and lingual musculature. Patient stressed/anxious vommiting this morning and wanting to go home. Therapist tried to work on swallow exercises; however, patient too anxious to do so and did not work on exercises. Pharyngeal Phase Comment: Patient presents with moderate-severe pharyngeal phase dysphagia. Patient demonstrated suspected reduced laryngeal elevation upon palpation and suspected delayed swallow initiation. Patient demonstrated strong coughs and wet/flemy voice. PO Trials  Neuromuscular Estim Used: No  Assessment Method(s): Palpation;Observation  Patient Position: Upright in bed. Vocal Quality: Wet  Consistency Presented: Other (comment) (None.)  Bolus Acceptance: No impairment  Bolus Formation/Control: No impairment  Propulsion: No impairment  Oral Residue: None  Laryngeal Elevation: Weak;Decreased  Aspiration Signs/Symptoms: Throat clear;Strong cough  Pharyngeal Phase Characteristics: Suspected pharyngeal residue;Feeling of discomfort; Foreign body sensation                        Dysphagia Diagnosis    Dysphagia Diagnosis: Concerns for esophageal stage dysphagia; Severe pharyngeal stage dysphagia;Mild oral stage dysphagia    Dysphagia Outcome Severity Scale: Level 1: Severe dysphagia- NPO. Unable to tolerate any PO safely    Recommendations    Requires SLP Intervention: Yes  Recommendations: Dysphagia treatment; Modified barium swallow study  Referral To: GI  Diet Solids Recommendation: NPO  Liquid Consistency Recommendation: Detail Level: Zone

## 2023-12-08 LAB
ANION GAP SERPL CALCULATED.3IONS-SCNC: 9 MMOL/L (ref 9–17)
BASOPHILS # BLD: 0.03 K/UL (ref 0–0.2)
BASOPHILS NFR BLD: 0 % (ref 0–2)
BUN SERPL-MCNC: 26 MG/DL (ref 8–23)
BUN/CREAT SERPL: 52 (ref 9–20)
CALCIUM SERPL-MCNC: 8.5 MG/DL (ref 8.6–10.4)
CHLORIDE SERPL-SCNC: 105 MMOL/L (ref 98–107)
CO2 SERPL-SCNC: 25 MMOL/L (ref 20–31)
CREAT SERPL-MCNC: 0.5 MG/DL (ref 0.5–0.9)
EOSINOPHIL # BLD: 0.04 K/UL (ref 0–0.44)
EOSINOPHILS RELATIVE PERCENT: 0 % (ref 1–4)
ERYTHROCYTE [DISTWIDTH] IN BLOOD BY AUTOMATED COUNT: 14.3 % (ref 11.8–14.4)
GFR SERPL CREATININE-BSD FRML MDRD: >60 ML/MIN/1.73M2
GLUCOSE SERPL-MCNC: 95 MG/DL (ref 70–99)
HCT VFR BLD AUTO: 31.7 % (ref 36.3–47.1)
HGB BLD-MCNC: 10.3 G/DL (ref 11.9–15.1)
IMM GRANULOCYTES # BLD AUTO: 0.06 K/UL (ref 0–0.3)
IMM GRANULOCYTES NFR BLD: 0 %
LYMPHOCYTES NFR BLD: 2.05 K/UL (ref 1.1–3.7)
LYMPHOCYTES RELATIVE PERCENT: 14 % (ref 24–43)
MCH RBC QN AUTO: 30.7 PG (ref 25.2–33.5)
MCHC RBC AUTO-ENTMCNC: 32.5 G/DL (ref 28.4–34.8)
MCV RBC AUTO: 94.6 FL (ref 82.6–102.9)
MONOCYTES NFR BLD: 0.64 K/UL (ref 0.1–1.2)
MONOCYTES NFR BLD: 4 % (ref 3–12)
NEUTROPHILS NFR BLD: 82 % (ref 36–65)
NEUTS SEG NFR BLD: 11.99 K/UL (ref 1.5–8.1)
NRBC BLD-RTO: 0 PER 100 WBC
PLATELET # BLD AUTO: 244 K/UL (ref 138–453)
PMV BLD AUTO: 9.4 FL (ref 8.1–13.5)
POTASSIUM SERPL-SCNC: 4.5 MMOL/L (ref 3.7–5.3)
RBC # BLD AUTO: 3.35 M/UL (ref 3.95–5.11)
SODIUM SERPL-SCNC: 139 MMOL/L (ref 135–144)
WBC OTHER # BLD: 14.8 K/UL (ref 3.5–11.3)

## 2023-12-08 PROCEDURE — 94664 DEMO&/EVAL PT USE INHALER: CPT

## 2023-12-08 PROCEDURE — C9113 INJ PANTOPRAZOLE SODIUM, VIA: HCPCS

## 2023-12-08 PROCEDURE — 85025 COMPLETE CBC W/AUTO DIFF WBC: CPT

## 2023-12-08 PROCEDURE — 94640 AIRWAY INHALATION TREATMENT: CPT

## 2023-12-08 PROCEDURE — 94761 N-INVAS EAR/PLS OXIMETRY MLT: CPT

## 2023-12-08 PROCEDURE — 1200000000 HC SEMI PRIVATE

## 2023-12-08 PROCEDURE — 2580000003 HC RX 258

## 2023-12-08 PROCEDURE — 94669 MECHANICAL CHEST WALL OSCILL: CPT

## 2023-12-08 PROCEDURE — A4216 STERILE WATER/SALINE, 10 ML: HCPCS

## 2023-12-08 PROCEDURE — 6360000002 HC RX W HCPCS

## 2023-12-08 PROCEDURE — 2700000000 HC OXYGEN THERAPY PER DAY

## 2023-12-08 PROCEDURE — 6370000000 HC RX 637 (ALT 250 FOR IP)

## 2023-12-08 PROCEDURE — 92526 ORAL FUNCTION THERAPY: CPT

## 2023-12-08 PROCEDURE — 6370000000 HC RX 637 (ALT 250 FOR IP): Performed by: INTERNAL MEDICINE

## 2023-12-08 PROCEDURE — 80048 BASIC METABOLIC PNL TOTAL CA: CPT

## 2023-12-08 PROCEDURE — 36415 COLL VENOUS BLD VENIPUNCTURE: CPT

## 2023-12-08 RX ADMIN — IPRATROPIUM BROMIDE AND ALBUTEROL SULFATE 1 DOSE: .5; 3 SOLUTION RESPIRATORY (INHALATION) at 05:58

## 2023-12-08 RX ADMIN — PREDNISONE 20 MG: 20 TABLET ORAL at 07:57

## 2023-12-08 RX ADMIN — SODIUM CHLORIDE 1500 MG: 9 INJECTION, SOLUTION INTRAVENOUS at 00:36

## 2023-12-08 RX ADMIN — FAMOTIDINE 20 MG: 20 TABLET ORAL at 07:57

## 2023-12-08 RX ADMIN — METOPROLOL TARTRATE 25 MG: 25 TABLET, FILM COATED ORAL at 07:57

## 2023-12-08 RX ADMIN — SODIUM CHLORIDE 1500 MG: 9 INJECTION, SOLUTION INTRAVENOUS at 12:28

## 2023-12-08 RX ADMIN — SODIUM CHLORIDE, PRESERVATIVE FREE 10 ML: 5 INJECTION INTRAVENOUS at 20:32

## 2023-12-08 RX ADMIN — SODIUM CHLORIDE 1500 MG: 9 INJECTION, SOLUTION INTRAVENOUS at 20:32

## 2023-12-08 RX ADMIN — PANTOPRAZOLE SODIUM 40 MG: 40 INJECTION, POWDER, FOR SOLUTION INTRAVENOUS at 07:57

## 2023-12-08 RX ADMIN — IPRATROPIUM BROMIDE AND ALBUTEROL SULFATE 1 DOSE: .5; 3 SOLUTION RESPIRATORY (INHALATION) at 19:41

## 2023-12-08 RX ADMIN — IPRATROPIUM BROMIDE AND ALBUTEROL SULFATE 1 DOSE: .5; 3 SOLUTION RESPIRATORY (INHALATION) at 11:02

## 2023-12-08 RX ADMIN — FLUTICASONE PROPIONATE 2 SPRAY: 50 SPRAY, METERED NASAL at 07:58

## 2023-12-08 RX ADMIN — THERA TABS 1 TABLET: TAB at 07:57

## 2023-12-08 RX ADMIN — SODIUM CHLORIDE 1500 MG: 9 INJECTION, SOLUTION INTRAVENOUS at 06:51

## 2023-12-08 RX ADMIN — SODIUM CHLORIDE, PRESERVATIVE FREE 10 ML: 5 INJECTION INTRAVENOUS at 07:58

## 2023-12-08 RX ADMIN — IPRATROPIUM BROMIDE AND ALBUTEROL SULFATE 1 DOSE: .5; 3 SOLUTION RESPIRATORY (INHALATION) at 14:43

## 2023-12-08 RX ADMIN — LEVOTHYROXINE SODIUM 125 MCG: 125 TABLET ORAL at 07:57

## 2023-12-08 RX ADMIN — ENOXAPARIN SODIUM 40 MG: 100 INJECTION SUBCUTANEOUS at 07:57

## 2023-12-08 NOTE — PROGRESS NOTES
Vitals and assessment completed. Patient is A&Ox4. Patient has expiratory wheezes throughout, on 2L nasal cannula weaned to 1L, denies shortness of breath. Heart rhythm is regular, denies chest pain. Bowel sounds are active, denies nausea or vomiting. No edema noted. Peg tube in place. Patient denies any pain. Call light within reach and chair alarm on.

## 2023-12-08 NOTE — PLAN OF CARE
Problem: Discharge Planning  Goal: Discharge to home or other facility with appropriate resources  Flowsheets (Taken 12/6/2023 1845 by Connie Escudero RN)  Discharge to home or other facility with appropriate resources: Identify barriers to discharge with patient and caregiver     Problem: Safety - Adult  Goal: Free from fall injury  Flowsheets (Taken 12/7/2023 0106 by Connie Escudero RN)  Free From Fall Injury: Instruct family/caregiver on patient safety     Problem: Nutrition Deficit:  Goal: Optimize nutritional status  Flowsheets (Taken 12/7/2023 2225)  Nutrient intake appropriate for improving, restoring, or maintaining nutritional needs:   Assess nutritional status and recommend course of action   Monitor oral intake, labs, and treatment plans   Recommend appropriate diets, oral nutritional supplements, and vitamin/mineral supplements   Order, calculate, and assess calorie counts as needed   Recommend, monitor, and adjust tube feedings and TPN/PPN based on assessed needs   Provide specific nutrition education to patient or family as appropriate

## 2023-12-08 NOTE — PROGRESS NOTES
Comprehensive Nutrition Assessment    Type and Reason for Visit:  Reassess    Nutrition Recommendations/Plan:   ?prokinetic? Increase free fluid via PEG     Malnutrition Assessment:  Malnutrition Status: At risk for malnutrition (Comment) (12/06/23 8356)    Context:  Acute Illness     Findings of the 6 clinical characteristics of malnutrition:  Energy Intake:  Mild decrease in energy intake (Comment) (with aspiration)  Weight Loss:  No significant weight loss     Body Fat Loss:  No significant body fat loss     Muscle Mass Loss:  No significant muscle mass loss    Fluid Accumulation:  No significant fluid accumulation     Strength:  Not Performed    Nutrition Assessment:    Continued suboptimal intakes with lesser tolerance to increased volume of the Jevity 1.2 in place of Jevity 1.5. Had some emesis yesterday and volume decreased to 250 per feeding at home. Additional information that she uses a pump at home may also be part of tolerance equation. Despite this, took only 159 ml of 250 on her feeding last PM.  Lower volumes would not be concerning IF she was able to return to safe PO intakes of fluid. Serum sodium improving. BUN elevations may be related to lower volumes of free water. Weight is up with ivf provision. Discussion with patient and provider RE: gastric emptying concerns as patient relates feeling full even after 4-4.5 hours after feedings. No known gastric emptying studies per patient. ? if a prokinetic would benefit. Nutrition Related Findings:    + b/s and soft bm. no edema. Wound Type: None       Current Nutrition Intake & Therapies:    Average Meal Intake: NPO  Average Supplements Intake: NPO  Current Tube Feeding (TF) Orders:  Feeding Route: PEG  Formula: Standard with Fiber  Schedule:  Bolus (Jevity 1.2 at hospital)  Feeding Regimen: jevity 1.2, 250 ml tid for meals with 30 ml flush before and after  Additives/Modulars: None  Water Flushes: 30 ml before and after bolus  Current TF &

## 2023-12-08 NOTE — PROGRESS NOTES
ML over past 24 hrs []  Ineffective and, or greater than 25 ml sputum prod. past 24 hrs. []  Nonspon- taneous; Requires suctioning 1   Pulmonary History  (PULM HX) []  No smoking and no chronic pulmonary history []  Former smoker. Quit over 12 mos. ago []  Current smoker or quit w/ in 12 mos []  Pulm. History and, or 20 pk/yr smoking hx [x]  Admitted w/ acute pulm. dx and, or has been admitted w/ pulm. dx 2 or more times over past 12 mos 4   Surgical History this Admit  (SURG HX) [x]  No surgery []  General surgery []  Lower abdominal []  Thoracic or upper abdominal   []  Thoracic w/ pulm. disease 0   Chest X-Ray (CXR)/CT Scan []  Clear or not applicable []  Not available []  Atelectasis or pleural effusions [x]  Localized infiltrate or pulm. edema []  Con-solidated Infiltrates, bilateral, or in more than 1 lobe 3   TOTAL ACUITY: 11       CARE PLAN    If Acuity Level is 2, 3, or 4 in any of the following:    [] BILATERAL BREATH SOUNDS (BBS)     [x] PULMONARY HISTORY (PULM HX)  [x] Respiratory Rate  (RR)    Goal: Improve respiratory functions in patients with airway disease and decrease WOB    [x] AEROSOL PROTOCOL    Total Acuity:   14-28  []  Secondary Assessment in 24 hrs Total Acuity:  9-13  [x]  Secondary Assessment in 24 hrs Total Acuity:  4-8  []  Secondary Assessment in 24 hrs Total Acuity:  0-3  []  Secondary Assessment in 48 hrs   HHN AEROSOL THERAPY with  [physician-ordered bronchodilator(s)] q 4 & Albuterol PRN q2 hrs. Breath-Actuated Neb if BBS Acuity = 4, and pt. can use MP. Notify physician if condition deteriorates. HHN AEROSOL THERAPY with  [physician-ordered bronchodilator(s)]  QID and Albuterol PRN q4 hrs. Breath-Actuated Neb if BBS Acuity = 4, and pt. can use MP. Notify physician if condition deteriorates. MDI THERAPY with  2 actuations of [physician-ordered bronchodilator(s)] via spacer TID Albuterol and PRN q4 hrs.    If unable to utilize MDI: HHN [physician-ordered bronchodilator(s)] TID

## 2023-12-08 NOTE — FLOWSHEET NOTE
Ambulated in halls with assist. Steady on feet. Returned to chair at bedside. Dinner time [employer] started as ordered. Call light within reach.  Chair alarm on for safety

## 2023-12-08 NOTE — PROGRESS NOTES
Physician Progress Note      PATIENT:               Edd Lundberg  CSN #:                  [id number]  :                       1945  ADMIT DATE:       2023 1:53 AM  DISCH DATE:  Keon Garcia  PROVIDER #:        Magnolia Dewey MD          QUERY TEXT:    Pt admitted 23 with aspiration pneumonitis. Pt noted to have admission   serum sodium 129    If possible, please document in the progress notes and discharge summary if   you are evaluating and / or treating any of the following: The medical record reflects the following:  Risk Factors: aspiration pneumonia, large hiatal hernia, peg tube for feeding,   vomiting prior to admission  Clinical Indicators: Serum sodium this admission: 129 -> 133-> 134-> 139  Treatment: NS infusion 75 ml/hr, Jevity tube feedings, lab monitoring    Michael Willingham, MSN, RN, CCDS, CRCR  Clinical   .  Options provided:  -- Hyponatremia  -- Clinically insignificant low serum sodium  -- Other - I will add my own diagnosis  -- Disagree - Not applicable / Not valid  -- Disagree - Clinically unable to determine / Unknown  -- Refer to Clinical Documentation Reviewer    PROVIDER RESPONSE TEXT:    This patient has hyponatremia.     Query created by: Michael Willingham on 2023 8:48 AM      Electronically signed by:  Magnolia Dewey MD 2023 2:38 PM

## 2023-12-08 NOTE — PROGRESS NOTES
[facility]    Facility/Department: Formerly [facility], later [facility] AT THE [facility] MED SURG    Speech Language Pathology    Dysphagia Treatment    Jamie Rios    : 1945 (74 y.o.)    MRN: [id number]    ROOM: [address] [place], [place] [id number]    ADMISSION DATE: 2023    PATIENT DIAGNOSIS(ES): Aspiration pneumonitis ([address]) [J69.0]    Chief Complaint   Patient presents with    Choking     Patient reports she was drinking hot cocoa approximately 9:30pm last night and choked with shortness of breath after choking and has not resolved. Patient arrived by squad with C-pap. Patient continue with cough and shortness of breath and tachycardia since arrival. Squad reported they started patient on c-pap and gave 3 breathing treatments. Patient Active Problem List    Diagnosis Date Noted    Aspiration pneumonitis ([address]) 2023    Eye disease 2023    Macular degeneration 2023    Acute respiratory failure with hypoxia ([address]) 2023       Past Medical History:   Diagnosis Date    Acute respiratory failure with hypoxia ([address]) 2023    Blindness     Left eye blindness    Macular degeneration     Right eye    Thyroid disease        Past Surgical History:   Procedure Laterality Date    FOOT SURGERY Bilateral     PILONIDAL CYST EXCISION         Allergies   Allergen Reactions    Sulfa Antibiotics Swelling    Avelox [Moxifloxacin] Rash       DATE ONSET: 2023    Date of Evaluation: 2023    Evaluating Therapist: SHANTELL Solomon    Dysphagia Diagnosis    Dysphagia Diagnosis: Concerns for esophageal stage dysphagia; Severe pharyngeal stage dysphagia;Mild oral stage dysphagia    Recommended Diet    Recommendations: Dysphagia treatment; Modified barium swallow study  Referral To: GI    Diet Solids Recommendation: NPO    Liquid Consistency Recommendation: NPO    Recommended Form of Meds: Via alternative means of nutrition      Reason for Referral    Jean Rose was referred for a bedside swallow evaluation to assess the efficiency of

## 2023-12-08 NOTE — FLOWSHEET NOTE
Sitting up in chair at bedside. Vitals checked. O 2 SATs 95 % on 1 liter of O@. PO2 removed. SATs 91 % on room air. Continuous pulse ox on. No needs at present time. Call light within reach.  Continue to monitor

## 2023-12-09 VITALS
RESPIRATION RATE: 18 BRPM | SYSTOLIC BLOOD PRESSURE: 139 MMHG | TEMPERATURE: 96.7 F | DIASTOLIC BLOOD PRESSURE: 67 MMHG | OXYGEN SATURATION: 93 % | HEART RATE: 73 BPM | WEIGHT: 138.45 LBS | BODY MASS INDEX: 26.14 KG/M2 | HEIGHT: 61 IN

## 2023-12-09 LAB
ANION GAP SERPL CALCULATED.3IONS-SCNC: 10 MMOL/L (ref 9–17)
BASOPHILS # BLD: <0.03 K/UL (ref 0–0.2)
BASOPHILS NFR BLD: 0 % (ref 0–2)
BUN SERPL-MCNC: 28 MG/DL (ref 8–23)
BUN/CREAT SERPL: 47 (ref 9–20)
CALCIUM SERPL-MCNC: 8.8 MG/DL (ref 8.6–10.4)
CHLORIDE SERPL-SCNC: 103 MMOL/L (ref 98–107)
CO2 SERPL-SCNC: 25 MMOL/L (ref 20–31)
CREAT SERPL-MCNC: 0.6 MG/DL (ref 0.5–0.9)
EOSINOPHIL # BLD: 0.09 K/UL (ref 0–0.44)
EOSINOPHILS RELATIVE PERCENT: 1 % (ref 1–4)
ERYTHROCYTE [DISTWIDTH] IN BLOOD BY AUTOMATED COUNT: 14.4 % (ref 11.8–14.4)
GFR SERPL CREATININE-BSD FRML MDRD: >60 ML/MIN/1.73M2
GLUCOSE SERPL-MCNC: 85 MG/DL (ref 70–99)
HCT VFR BLD AUTO: 32.3 % (ref 36.3–47.1)
HGB BLD-MCNC: 10.4 G/DL (ref 11.9–15.1)
IMM GRANULOCYTES # BLD AUTO: 0.05 K/UL (ref 0–0.3)
IMM GRANULOCYTES NFR BLD: 1 %
LYMPHOCYTES NFR BLD: 2.3 K/UL (ref 1.1–3.7)
LYMPHOCYTES RELATIVE PERCENT: 24 % (ref 24–43)
MCH RBC QN AUTO: 30.4 PG (ref 25.2–33.5)
MCHC RBC AUTO-ENTMCNC: 32.2 G/DL (ref 28.4–34.8)
MCV RBC AUTO: 94.4 FL (ref 82.6–102.9)
MONOCYTES NFR BLD: 0.54 K/UL (ref 0.1–1.2)
MONOCYTES NFR BLD: 6 % (ref 3–12)
NEUTROPHILS NFR BLD: 70 % (ref 36–65)
NEUTS SEG NFR BLD: 6.81 K/UL (ref 1.5–8.1)
NRBC BLD-RTO: 0 PER 100 WBC
PLATELET # BLD AUTO: 231 K/UL (ref 138–453)
PMV BLD AUTO: 9.1 FL (ref 8.1–13.5)
POTASSIUM SERPL-SCNC: 3.9 MMOL/L (ref 3.7–5.3)
RBC # BLD AUTO: 3.42 M/UL (ref 3.95–5.11)
SODIUM SERPL-SCNC: 138 MMOL/L (ref 135–144)
WBC OTHER # BLD: 9.8 K/UL (ref 3.5–11.3)

## 2023-12-09 PROCEDURE — C9113 INJ PANTOPRAZOLE SODIUM, VIA: HCPCS

## 2023-12-09 PROCEDURE — 94640 AIRWAY INHALATION TREATMENT: CPT

## 2023-12-09 PROCEDURE — 2580000003 HC RX 258

## 2023-12-09 PROCEDURE — 6370000000 HC RX 637 (ALT 250 FOR IP): Performed by: INTERNAL MEDICINE

## 2023-12-09 PROCEDURE — A4216 STERILE WATER/SALINE, 10 ML: HCPCS

## 2023-12-09 PROCEDURE — 6360000002 HC RX W HCPCS

## 2023-12-09 PROCEDURE — 94761 N-INVAS EAR/PLS OXIMETRY MLT: CPT

## 2023-12-09 PROCEDURE — 36415 COLL VENOUS BLD VENIPUNCTURE: CPT

## 2023-12-09 PROCEDURE — 94669 MECHANICAL CHEST WALL OSCILL: CPT

## 2023-12-09 PROCEDURE — 6370000000 HC RX 637 (ALT 250 FOR IP)

## 2023-12-09 PROCEDURE — 80048 BASIC METABOLIC PNL TOTAL CA: CPT

## 2023-12-09 PROCEDURE — 85025 COMPLETE CBC W/AUTO DIFF WBC: CPT

## 2023-12-09 RX ORDER — IPRATROPIUM BROMIDE AND ALBUTEROL SULFATE 2.5; .5 MG/3ML; MG/3ML
1 SOLUTION RESPIRATORY (INHALATION) 3 TIMES DAILY
Qty: 360 ML | Refills: 1 | Status: SHIPPED | OUTPATIENT
Start: 2023-12-09

## 2023-12-09 RX ORDER — ASCORBIC ACID 500 MG
500 TABLET ORAL DAILY
Status: DISCONTINUED | OUTPATIENT
Start: 2023-12-09 | End: 2023-12-09 | Stop reason: HOSPADM

## 2023-12-09 RX ORDER — IPRATROPIUM BROMIDE AND ALBUTEROL SULFATE 2.5; .5 MG/3ML; MG/3ML
1 SOLUTION RESPIRATORY (INHALATION) 3 TIMES DAILY
Qty: 360 ML | Refills: 1 | Status: SHIPPED
Start: 2023-12-09

## 2023-12-09 RX ORDER — AMOXICILLIN AND CLAVULANATE POTASSIUM 400; 57 MG/5ML; MG/5ML
11 POWDER, FOR SUSPENSION ORAL 2 TIMES DAILY
Qty: 220 ML | Refills: 0 | Status: SHIPPED | OUTPATIENT
Start: 2023-12-09 | End: 2023-12-19

## 2023-12-09 RX ADMIN — LEVOTHYROXINE SODIUM 125 MCG: 125 TABLET ORAL at 08:30

## 2023-12-09 RX ADMIN — FAMOTIDINE 20 MG: 20 TABLET ORAL at 08:30

## 2023-12-09 RX ADMIN — METOPROLOL TARTRATE 25 MG: 25 TABLET, FILM COATED ORAL at 08:30

## 2023-12-09 RX ADMIN — SODIUM CHLORIDE 1500 MG: 9 INJECTION, SOLUTION INTRAVENOUS at 00:29

## 2023-12-09 RX ADMIN — PREDNISONE 20 MG: 20 TABLET ORAL at 08:30

## 2023-12-09 RX ADMIN — ENOXAPARIN SODIUM 40 MG: 100 INJECTION SUBCUTANEOUS at 08:29

## 2023-12-09 RX ADMIN — IPRATROPIUM BROMIDE AND ALBUTEROL SULFATE 1 DOSE: .5; 3 SOLUTION RESPIRATORY (INHALATION) at 10:35

## 2023-12-09 RX ADMIN — SODIUM CHLORIDE, PRESERVATIVE FREE 10 ML: 5 INJECTION INTRAVENOUS at 08:33

## 2023-12-09 RX ADMIN — PANTOPRAZOLE SODIUM 40 MG: 40 INJECTION, POWDER, FOR SOLUTION INTRAVENOUS at 08:30

## 2023-12-09 RX ADMIN — FLUTICASONE PROPIONATE 2 SPRAY: 50 SPRAY, METERED NASAL at 08:32

## 2023-12-09 RX ADMIN — IPRATROPIUM BROMIDE AND ALBUTEROL SULFATE 1 DOSE: .5; 3 SOLUTION RESPIRATORY (INHALATION) at 05:33

## 2023-12-09 RX ADMIN — SODIUM CHLORIDE 1500 MG: 9 INJECTION, SOLUTION INTRAVENOUS at 08:29

## 2023-12-09 RX ADMIN — THERA TABS 1 TABLET: TAB at 08:33

## 2023-12-09 NOTE — PLAN OF CARE
Problem: Discharge Planning  Goal: Discharge to home or other facility with appropriate resources  12/9/2023 1058 by Stephanie Goldstein RN  Outcome: Completed  12/8/2023 2152 by Layo Newman RN  Outcome: Progressing  Flowsheets (Taken 12/8/2023 2152)  Discharge to home or other facility with appropriate resources:   Identify barriers to discharge with patient and caregiver   Identify discharge learning needs (meds, wound care, etc)     Problem: Safety - Adult  Goal: Free from fall injury  12/9/2023 1058 by Stephanie Goldstein RN  Outcome: Completed  12/8/2023 2152 by Layo Newman RN  Outcome: Progressing  Flowsheets (Taken 12/8/2023 2152)  Free From Fall Injury: Instruct family/caregiver on patient safety     Problem: Nutrition Deficit:  Goal: Optimize nutritional status  Outcome: Completed     Problem: Skin/Tissue Integrity  Goal: Absence of new skin breakdown  Description: 1. Monitor for areas of redness and/or skin breakdown  2. Assess vascular access sites hourly  3. Every 4-6 hours minimum:  Change oxygen saturation probe site  4. Every 4-6 hours:  If on nasal continuous positive airway pressure, respiratory therapy assess nares and determine need for appliance change or resting period. 12/9/2023 1058 by Stephanie Goldstein RN  Outcome: Completed  12/8/2023 2152 by Layo Newman RN  Outcome: Progressing  Note: Toney scale monitoring per protocol. Inspect skin for breakdown frequently. Encourage pt to make frequent large adjustments in position or assist patient with turning. Document all areas of breakdown.

## 2023-12-09 NOTE — PROGRESS NOTES
Writer at bedside to complete evening assessment. Upon entry to room, pt awake and in bed, respirations normal and unlabored while on room air. Vitals obtained and assessment completed, see flow sheet for details. Pt denies needs from writer at this time. Call light in reach. Care is ongoing.

## 2023-12-09 NOTE — PROGRESS NOTES
Went over D/C instructions with pt and daughter. Provided them with the DME for nebulizer and spoke with pharmacy about the liquid antibiotic. IV and telemetry was removed. Pt taken down to daughters vehicle at this time via wc.

## 2023-12-09 NOTE — PROGRESS NOTES
Pt up in recliner for AM assessment. VS and assessment as charted. Denies pain or discomfort. Medications given via PEG tube without issue. Asked to wait on TF for a bit to let stomach settle. Discharge order came in and pts daughter spoke to writer about needing the antibiotic in liquid form and they need order for nebulizer. Spoke to Dr. Julio C Cooley that said that was fine to have changed and to put in for DME. TF completed around 1015.

## 2023-12-09 NOTE — PLAN OF CARE
Problem: Discharge Planning  Goal: Discharge to home or other facility with appropriate resources  Outcome: Progressing  Flowsheets (Taken 12/8/2023 2152)  Discharge to home or other facility with appropriate resources:   Identify barriers to discharge with patient and caregiver   Identify discharge learning needs (meds, wound care, etc)     Problem: Safety - Adult  Goal: Free from fall injury  Outcome: Progressing  Flowsheets (Taken 12/8/2023 2152)  Free From Fall Injury: Instruct family/caregiver on patient safety     Problem: Skin/Tissue Integrity  Goal: Absence of new skin breakdown  Description: 1. Monitor for areas of redness and/or skin breakdown  2. Assess vascular access sites hourly  3. Every 4-6 hours minimum:  Change oxygen saturation probe site  4. Every 4-6 hours:  If on nasal continuous positive airway pressure, respiratory therapy assess nares and determine need for appliance change or resting period. Outcome: Progressing  Note: Toney scale monitoring per protocol. Inspect skin for breakdown frequently. Encourage pt to make frequent large adjustments in position or assist patient with turning. Document all areas of breakdown.

## 2023-12-10 LAB
MICROORGANISM SPEC CULT: NORMAL
MICROORGANISM SPEC CULT: NORMAL
SERVICE CMNT-IMP: NORMAL
SERVICE CMNT-IMP: NORMAL
SPECIMEN DESCRIPTION: NORMAL
SPECIMEN DESCRIPTION: NORMAL
